# Patient Record
Sex: FEMALE | Race: WHITE | NOT HISPANIC OR LATINO | Employment: OTHER | URBAN - METROPOLITAN AREA
[De-identification: names, ages, dates, MRNs, and addresses within clinical notes are randomized per-mention and may not be internally consistent; named-entity substitution may affect disease eponyms.]

---

## 2016-11-11 LAB — CRC RECOMMENDATION EXT: NORMAL

## 2017-12-04 ENCOUNTER — HISTORICAL (OUTPATIENT)
Dept: ADMINISTRATIVE | Facility: HOSPITAL | Age: 67
End: 2017-12-04

## 2018-04-13 ENCOUNTER — HISTORICAL (OUTPATIENT)
Dept: ADMINISTRATIVE | Facility: HOSPITAL | Age: 68
End: 2018-04-13

## 2019-01-04 LAB — BMD RECOMMENDATION EXT: NORMAL

## 2020-06-16 ENCOUNTER — HISTORICAL (OUTPATIENT)
Dept: ADMINISTRATIVE | Facility: HOSPITAL | Age: 70
End: 2020-06-16

## 2021-10-08 ENCOUNTER — HISTORICAL (OUTPATIENT)
Dept: ADMINISTRATIVE | Facility: HOSPITAL | Age: 71
End: 2021-10-08

## 2021-10-08 LAB
ABS NEUT (OLG): 2.81 X10(3)/MCL (ref 2.1–9.2)
ALBUMIN SERPL-MCNC: 4.2 GM/DL (ref 3.4–4.8)
ALBUMIN/GLOB SERPL: 1.7 RATIO (ref 1.1–2)
ALP SERPL-CCNC: 76 UNIT/L (ref 40–150)
ALT SERPL-CCNC: 28 UNIT/L (ref 0–55)
APPEARANCE, UA: ABNORMAL
AST SERPL-CCNC: 31 UNIT/L (ref 5–34)
BACTERIA SPEC CULT: ABNORMAL /HPF
BASOPHILS # BLD AUTO: 0.1 X10(3)/MCL (ref 0–0.2)
BASOPHILS NFR BLD AUTO: 1 %
BILIRUB SERPL-MCNC: 0.7 MG/DL
BILIRUB UR QL STRIP: NEGATIVE
BILIRUBIN DIRECT+TOT PNL SERPL-MCNC: 0.3 MG/DL (ref 0–0.5)
BILIRUBIN DIRECT+TOT PNL SERPL-MCNC: 0.4 MG/DL (ref 0–0.8)
BUN SERPL-MCNC: 23.5 MG/DL (ref 9.8–20.1)
CALCIUM SERPL-MCNC: 10.4 MG/DL (ref 8.4–10.2)
CHLORIDE SERPL-SCNC: 105 MMOL/L (ref 98–107)
CHOLEST SERPL-MCNC: 192 MG/DL
CHOLEST/HDLC SERPL: 4 {RATIO} (ref 0–5)
CO2 SERPL-SCNC: 28 MMOL/L (ref 23–31)
COLOR UR: YELLOW
CREAT SERPL-MCNC: 0.97 MG/DL (ref 0.55–1.02)
EOSINOPHIL # BLD AUTO: 0.2 X10(3)/MCL (ref 0–0.9)
EOSINOPHIL NFR BLD AUTO: 4 %
ERYTHROCYTE [DISTWIDTH] IN BLOOD BY AUTOMATED COUNT: 13.4 % (ref 11.5–17)
GLOBULIN SER-MCNC: 2.5 GM/DL (ref 2.4–3.5)
GLUCOSE (UA): NEGATIVE
GLUCOSE SERPL-MCNC: 90 MG/DL (ref 82–115)
HCT VFR BLD AUTO: 44.2 % (ref 37–47)
HDLC SERPL-MCNC: 52 MG/DL (ref 35–60)
HGB BLD-MCNC: 14.3 GM/DL (ref 12–16)
HGB UR QL STRIP: NEGATIVE
KETONES UR QL STRIP: NEGATIVE
LDLC SERPL CALC-MCNC: 128 MG/DL (ref 50–140)
LEUKOCYTE ESTERASE UR QL STRIP: ABNORMAL
LYMPHOCYTES # BLD AUTO: 2.4 X10(3)/MCL (ref 0.6–4.6)
LYMPHOCYTES NFR BLD AUTO: 38 %
MCH RBC QN AUTO: 30 PG (ref 27–31)
MCHC RBC AUTO-ENTMCNC: 32.4 GM/DL (ref 33–36)
MCV RBC AUTO: 92.9 FL (ref 80–94)
MONOCYTES # BLD AUTO: 0.7 X10(3)/MCL (ref 0.1–1.3)
MONOCYTES NFR BLD AUTO: 11 %
NEUTROPHILS # BLD AUTO: 2.81 X10(3)/MCL (ref 2.1–9.2)
NEUTROPHILS NFR BLD AUTO: 45 %
NITRITE UR QL STRIP: NEGATIVE
PH UR STRIP: 7.5 [PH] (ref 5–9)
PLATELET # BLD AUTO: 222 X10(3)/MCL (ref 130–400)
PMV BLD AUTO: 11.3 FL (ref 9.4–12.4)
POTASSIUM SERPL-SCNC: 3.9 MMOL/L (ref 3.5–5.1)
PROT SERPL-MCNC: 6.7 GM/DL (ref 5.8–7.6)
PROT UR QL STRIP: NEGATIVE
RBC # BLD AUTO: 4.76 X10(6)/MCL (ref 4.2–5.4)
RBC #/AREA URNS HPF: ABNORMAL /[HPF]
SODIUM SERPL-SCNC: 143 MMOL/L (ref 136–145)
SP GR UR STRIP: 1.02 (ref 1–1.03)
SQUAMOUS EPITHELIAL, UA: ABNORMAL /HPF (ref 0–4)
TRIGL SERPL-MCNC: 59 MG/DL (ref 37–140)
TSH SERPL-ACNC: 1.51 UIU/ML (ref 0.35–4.94)
UROBILINOGEN UR STRIP-ACNC: 0.2
VLDLC SERPL CALC-MCNC: 12 MG/DL
WBC # SPEC AUTO: 6.2 X10(3)/MCL (ref 4.5–11.5)
WBC #/AREA URNS HPF: ABNORMAL /[HPF]

## 2021-10-11 ENCOUNTER — HISTORICAL (OUTPATIENT)
Dept: ADMINISTRATIVE | Facility: HOSPITAL | Age: 71
End: 2021-10-11

## 2021-10-11 LAB — SARS-COV-2 RNA RESP QL NAA+PROBE: NEGATIVE

## 2021-12-20 ENCOUNTER — HISTORICAL (OUTPATIENT)
Dept: ADMINISTRATIVE | Facility: HOSPITAL | Age: 71
End: 2021-12-20

## 2022-01-18 ENCOUNTER — HISTORICAL (OUTPATIENT)
Dept: LAB | Facility: HOSPITAL | Age: 72
End: 2022-01-18

## 2022-01-19 ENCOUNTER — HISTORICAL (OUTPATIENT)
Dept: SURGERY | Facility: HOSPITAL | Age: 72
End: 2022-01-19

## 2022-02-15 ENCOUNTER — HISTORICAL (OUTPATIENT)
Dept: LAB | Facility: HOSPITAL | Age: 72
End: 2022-02-15

## 2022-02-15 LAB — SARS-COV-2 AG RESP QL IA.RAPID: NEGATIVE

## 2022-02-16 ENCOUNTER — HISTORICAL (OUTPATIENT)
Dept: SURGERY | Facility: HOSPITAL | Age: 72
End: 2022-02-16

## 2022-04-07 ENCOUNTER — HISTORICAL (OUTPATIENT)
Dept: ADMINISTRATIVE | Facility: HOSPITAL | Age: 72
End: 2022-04-07
Payer: MEDICARE

## 2022-04-23 VITALS
WEIGHT: 141.13 LBS | SYSTOLIC BLOOD PRESSURE: 126 MMHG | BODY MASS INDEX: 21.39 KG/M2 | DIASTOLIC BLOOD PRESSURE: 81 MMHG | OXYGEN SATURATION: 96 % | HEIGHT: 68 IN

## 2022-05-01 NOTE — HISTORICAL [facility] CERNER
This is a historical note converted from Cergifty. Formatting and pictures may have been removed.  Please reference Cergifty for original formatting and attached multimedia. Chief Complaint  on and off few weeks sob, chest pressure, cough, tried taking mucinex with did help with phlem, denies fever or body aches  History of Present Illness  70-year-old female?presents with on and off symptoms of chest pressure and shortness of breath and cough.?Some improvement with Mucinex. No fever or body aches.  Review of Systems  Constitutional_negative for fever  ENMT_+rhinorrhea  Respiratory_+ cough  Gastrointestinal_negative for nausea or vomiting  Integumentary_negative for rash  Physical Exam  Vitals & Measurements  T:?36.9? ?C (Oral)? HR:?74(Peripheral)? RR:?17? BP:?124/83? SpO2:?98%?  HT:?167.64?cm? WT:?67.13?kg? BMI:?23.89?  Gen: WD NAD  CV: S1S2 RRR no MRG  Resp: few scattered wheezes  HEENT: clear rhinorrhea with enlarged erythematous nasal turbinates bilaterally, no cervical UNA, +PND, TMs without bulging or erythema bilaterally, no posterior pharyngeal erythema?or tonsillar erythema enlargement or exudate  Extr: no CCE  Integument: warm, no rashes or lesions  Psych: Cooperative, approp mood and affect  Assessment/Plan  1.?Chest congestion?R09.89  ?Prednisone taper, take with food.? May use albuterol inhaler?as needed for wheezing and shortness of breath or cough, 2 puffs every 4 hours, use with spacer/adapter.?  X-ray the chest done today, per my review and per final no pneumonia or other acute concerns.  If persistent may need to get evaluated by Allergist.  Ordered:  albuterol, 2 puff(s), INH, q4hr, PRN PRN for wheezing, # 1 EA, 0 Refill(s), Pharmacy: [id number], 167.64, cm, Height/Length Dosing, 06/16/20 9:13:00 CDT, 67.13, kg, Weight Dosing, 06/16/20 9:13:00 CDT  predniSONE, 10 mg = 1 tab(s), Oral, Daily, take 4 tabs for 3 days 3 tabs for 3 days 2 tabs for 3 days 1 tab for 3 days then 1/2 tab for 3 days,  # 32 tab(s), 0 Refill(s), Pharmacy: [id number], 167.64, cm, Height/Length Dosing, 06/16/20 9:13:00 CDT,...  ?   Problem List/Past Medical History  Ongoing  Bladder spasms  HTN (hypertension)  LAD (lymphadenopathy)  Neck swelling  Urinary urgency  Well adult  Well adult exam  Historical  No qualifying data  Procedure/Surgical History  Cataract surgery (10.2015)  Cataract surgery (09.2015)  Partial hysterectomy (08/04/2001)   Medications  Azopt 1% Opthal. Susp  brimonidine 0.15% ophthalmic solution  Citracal Calcium + D Slow Release 1200, 2 tab(s), Oral, qAM  Flax Seed Oil oral capsule  hydrochlorothiazide-triamterene 25 mg-37.5 mg oral tablet, See Instructions  Lumigan 0.01% ophthalmic solution  Poly Hist Forte 10.5 mg-10 mg oral tablet, 1 tab(s), Oral, q4hr, PRN, 1 refills  Poly Hist Forte 7.5 mg-10 mg oral tablet, See Instructions, 3 refills,? ?Not taking  prednisONE 10 mg oral tablet, 10 mg= 1 tab(s), Oral, Daily  PreserVision oral tablet, 1 tab(s), Oral, BID  ProAir HFA 90 mcg/inh inhalation aerosol with adapter, 2 puff(s), INH, q4hr, PRN  Super B Complex oral tablet, 1 tab(s), Oral, Daily  Allergies  Contrast Dye  NSAIDs  codeine  Social History  Abuse/Neglect  No, 06/16/2020  Alcohol  Current, 1-2 times per week, 08/04/2015  Employment/School  Employed, 08/04/2015  Exercise  Exercise frequency: 3-4 times/week., 08/04/2015  Home/Environment  Lives with Spouse., 08/04/2015  Nutrition/Health  Regular, 08/04/2015  Sexual  Sexually active: Yes., 02/21/2017  Substance Use  Never, 08/04/2015  Tobacco  Never (less than 100 in lifetime), N/A, 06/16/2020  Never (less than 100 in lifetime), No, 03/15/2019  Family History  CAD (coronary artery disease): Father.  Glaucoma.: Mother.  Immunizations  Vaccine Date Status   influenza virus vaccine, inactivated 10/16/2017 Recorded   pneumococcal 23-valent vaccine 11/09/2016 Recorded   influenza virus vaccine, inactivated 10/17/2016 Recorded   influenza virus vaccine,  inactivated 10/21/2015 Recorded   Health Maintenance  Health Maintenance  ???Pending?(in the next year)  ??? ??OverDue  ??? ? ? ?Pneumococcal Vaccine due??and every?  ??? ? ? ?Pneumococcal Vaccine due??12/12/17??and every 100??year(s)  ??? ? ? ?TB Skin Test due??07/30/19??and every 1??year(s)  ??? ? ? ?Advance Directive due??01/01/20??and every 1??year(s)  ??? ? ? ?Alcohol Misuse Screening due??01/01/20??and every 1??year(s)  ??? ? ? ?Aspirin Therapy for CVD Prevention due??03/15/20??and every 1??year(s)  ??? ? ? ?Hypertension Maintenance-Medication Prescribed due??03/15/20??and every 1??year(s)  ??? ? ? ?Hypertension Management-Education due??03/15/20??and every 1??year(s)  ??? ? ? ?Hypertension Management-BMP due??03/28/20??and every 1??year(s)  ??? ??Due?  ??? ? ? ?ADL Screening due??06/16/20??and every 1??year(s)  ??? ? ? ?Tetanus Vaccine due??06/16/20??and every 10??year(s)  ??? ? ? ?Zoster Vaccine due??06/16/20??and every 100??year(s)  ??? ??Due In Future?  ??? ? ? ?Cognitive Screening not due until??01/01/21??and every 1??year(s)  ??? ? ? ?Fall Risk Assessment not due until??01/01/21??and every 1??year(s)  ??? ? ? ?Functional Assessment not due until??01/01/21??and every 1??year(s)  ??? ? ? ?Obesity Screening not due until??01/01/21??and every 1??year(s)  ??? ? ? ?Bone Density Screening not due until??01/04/21??and every 2??year(s)  ???Satisfied?(in the past 1 year)  ??? ??Satisfied?  ??? ? ? ?Blood Pressure Screening on??06/16/20.??Satisfied by Jaison Black LPN  ??? ? ? ?Body Mass Index Check on??06/16/20.??Satisfied by Jaison Black LPN  ??? ? ? ?Breast Cancer Screening ([facility]) on??01/10/20.  ??? ? ? ?Fall Risk Assessment on??06/16/20.??Satisfied by Jaison Black LPN  ??? ? ? ?Hypertension Management-Blood Pressure on??06/16/20.??Satisfied by Jaison Black LPN  ??? ? ? ?Obesity Screening on??06/16/20.??Satisfied by Jaison Black LPN  ?

## 2022-05-01 NOTE — HISTORICAL [facility] CERNER
This is a historical note converted from Darvin. Formatting and pictures may have been removed.  Please reference Darvin for original formatting and attached multimedia. Chief Complaint  New patient here with right ankle pain after twisting 12/1/2017. Xrays today.  History of Present Illness  She is a pleasant 67-year-old female who?was in [place] on 12/1/17 and twisted her ankle?while walking the streets. She had?no real pain was able to continue the day but the next morning woke up with pain and swelling. Swelling is localized laterally.?Pain also bothers her worse with ambulation. Somewhat better with rest. She is?elevated the leg?and is used ice. Shes taken Tylenol intermittently as she has an ALLERGY to NSAIDs  Review of Systems  Comprehensive review of system?was performed with no exceptions other than noted in the history of present illness  Physical Exam  Vitals & Measurements  BP:?132/70?  HT:?167.64?cm? HT:?167.64?cm? HT:?167.64?cm? WT:?65.317?kg? WT:?65.317?kg? WT:?65.317?kg? BMI:?23.24?  Gen: WN, WD, NAD  Card/Res: NL breathing, +distal pulses  Abdomen: ND  Muscular skeletal: She has swelling?along the lateral part of her ankle.?She has good range of motion the ankle. Shes tender palpation over her lateral ligaments and less so over her lateral malleolus. She is nontender over her fifth metatarsal.?She has good range of motion her foot. Shes neurovascularly intact. + DP pulse  Assessment/Plan  1.?Left ankle sprain  Rest,?Tylenol,?home directed?therapy,?ice, and elevation.?If her pain persists well consider formal physical therapy  Ordered:  Office/Outpatient Visit Level 1 New [id number] PC, Left ankle sprain, [facility], 12/04/17 13:37:00 CST  ?  Orders:  XR Ankle Left Minimum 3 Views, Routine, 12/04/17 13:13:00 CST, Pain, None, Ambulatory, Rad Type, Left ankle pain, Not Scheduled, 12/04/17 13:13:00 CST   Problem List/Past Medical History  Ongoing  Bladder spasms  HTN (hypertension)  LAD  (lymphadenopathy)  Neck swelling  Urinary urgency  Well adult  Historical  Procedure/Surgical History  Cataract surgery (10/2015)  Cataract surgery (09/2015)  Partial hysterectomy (08/04/2001)  Medications  Azopt 1% Opthal. Susp, 1 drop(s), OPTH, TID,? ?Not taking  biotin 1000 mcg oral tablet, 1000 mcg= 1 tab(s), Oral, Daily  Citracal Calcium + D Slow Release 1200, 2 tab(s), Oral, qAM  Fish Oil 1200 mg oral capsule, 1200 mg= 1 cap(s), Oral, BID  Flax Seed Oil oral capsule  hydroCHLOROthiazide-triamterene 25 mg-37.5 mg oral tablet, 1 tab(s), Oral, Daily, 3 refills  Icaps oral tablet  Lumigan 0.01% ophthalmic solution, 1 drop(s), Eye-Both, qPM  Medrol Dosepak 4 mg oral tablet, 1 packet(s), Oral, As Directed,? ?Not taking  Poly Hist Forte 7.5 mg-10 mg oral tablet, See Instructions, 10 refills  Premarin 0.625 mg oral tablet, 0.625 mg= 1 tab(s), Oral, Daily  PreserVision oral tablet, 1 tab(s), Oral, BID  SIMBRINZA 1%-0.2% EYE DROPS  Super B Complex oral tablet, 1 tab(s), Oral, Daily  Allergies  Contrast Dye  NSAIDs  codeine  Social History  Alcohol - 02/21/2017  Current, 1-2 times per week  Employment/School - 02/21/2017  Employed  Exercise - 02/21/2017  Exercise frequency: 3-4 times/week.  Home/Environment - 02/21/2017  Lives with Spouse.  Nutrition/Health - 02/21/2017  Regular  Sexual - 02/21/2017  Sexually active: Yes.  Substance Abuse - 02/21/2017  Never  Tobacco - 02/21/2017  Never smoker  Family History  CAD (coronary artery disease): Father.  Glaucoma.: Mother.  Diagnostic Results  Ankle radiographs 12/4/17: 3 views ankle show no fracture

## 2022-05-02 NOTE — HISTORICAL [facility] CERNER
This is a historical note converted from Darvin. Formatting and pictures may have been removed.  Please reference Darvin for original formatting and attached multimedia. Procedure Name  1. Left L2 Transforaminal Epidural Steroid Injection  2. Left L4 Transforaminal Epidural Steroid Injection  ?  Pre-Procedure Diagnoses:  1. Chronic pain syndrome  2. Low back pain  3. Lumbar radiculopathy  4. Lumbar disc displacement  5. Lumbar degenerative disc disease  ?   Post-Procedure Diagnoses:  1. Chronic pain syndrome  2. Low back pain  3. Lumbar radiculopathy  4. Lumbar disc displacement  5. Lumbar degenerative disc disease  ?   Anesthesia:  Local and MAC  ?   Estimated Blood Loss:  None  ?  Complications:  None  ?  Informed Consent:  The procedure, risks, benefits, and alternatives were discussed with the patient.? There were no contraindications to the procedure.? The patient expressed understanding and agreed to proceed.? Fully informed written consent was obtained.?  ?  Description of the Procedure:  The patient was taken to the operating room.? IV access was obtained prior to the start of the procedure.? The patient was positioned prone on the fluoroscopy table.? Continuous hemodynamic monitoring was initiated and continued throughout the duration of the procedure.? The skin overlying the lumbosacral spine was prepped with Chloraprep and draped into a sterile field.? An oblique fluoroscopic view was obtained on the left side at L4, with the superior articular process of the inferior vertebral body aligned with the pedicle.? Skin anesthesia was achieved using 1 mL of 1% lidocaine.? A 22-gauge 3.5-inch Quinke spinal needle was slowly inserted and advanced towards the 6 oclock position of the pedicle and into the epidural space.? Proper needle position was confirmed under AP, oblique, and lateral fluoroscopic views.? Negative aspiration for blood or CSF was confirmed.? 0.5 mL of Isovue contrast was injected.? Fluoroscopic  imaging revealed a clear outline of the spinal nerve with proximal spread of agent through the neural foramen and into the epidural space.? Then a combination of 5 mg of dexamethasone and 1 mL of 0.25% bupivacaine was easily injected.? There was no pain on injection.? The needle was removed intact and bleeding was nil.? The same procedure was repeated in identical fashion on the left side at L2. Sterile bandages were applied.? The patient was taken to the recovery room for further observation in stable condition.? The patient was then discharged home without any complications.

## 2022-05-21 NOTE — HISTORICAL [facility] CERNER
This is a historical note converted from Darvin. Formatting and pictures may have been removed.  Please reference Darvin for original formatting and attached multimedia. Procedure Name  1. Left L2?Transforaminal Epidural Steroid Injection  2. Left L3 Transforaminal Epidural Steroid Injection  ?  Pre-Procedure Diagnoses:  1. Chronic pain syndrome  2. Low back pain  3. Lumbar radiculopathy  4. Lumbar disc displacement  5. Lumbar degenerative disc disease  ?   Post-Procedure Diagnoses:  1. Chronic pain syndrome  2. Low back pain  3. Lumbar radiculopathy  4. Lumbar disc displacement  5. Lumbar degenerative disc disease  ?   Anesthesia:  Local and MAC  ?   Estimated Blood Loss:  None  ?  Complications:  None  ?  Informed Consent:  The procedure, risks, benefits, and alternatives were discussed with the patient.? There were no contraindications to the procedure.? The patient expressed understanding and agreed to proceed.? Fully informed written consent was obtained.?  ?  Description of the Procedure:  The patient was taken to the operating room.? IV access was obtained prior to the start of the procedure.? The patient was positioned prone on the fluoroscopy table.? Continuous hemodynamic monitoring was initiated and continued throughout the duration of the procedure.? The skin overlying the lumbosacral spine was prepped with Chloraprep and draped into a sterile field.? An oblique fluoroscopic view was obtained on the left side at L2, with the superior articular process of the inferior vertebral body aligned with the pedicle.? Skin anesthesia was achieved using 1 mL of 1% lidocaine.? A 22-gauge 3.5-inch Quinke spinal needle was slowly inserted and advanced towards the 6 oclock position of the pedicle and into the epidural space.? Proper needle position was confirmed under AP, oblique, and lateral fluoroscopic views.? Negative aspiration for blood or CSF was confirmed.? 0.5 mL of Isovue contrast was injected.? Fluoroscopic  imaging revealed a clear outline of the spinal nerve with proximal spread of agent through the neural foramen and into the epidural space.? Then a combination of 5 mg of dexamethasone and 1 mL of 0.25% bupivacaine was easily injected.? There was no pain on injection.? The needle was removed intact and bleeding was nil.? The same procedure was repeated in identical fashion on the left side at L3. Sterile bandages were applied.? The patient was taken to the recovery room for further observation in stable condition.? The patient was then discharged home without any complications.

## 2022-05-23 RX ORDER — AZITHROMYCIN 500 MG/1
500 TABLET, FILM COATED ORAL DAILY
Qty: 5 TABLET | Refills: 0 | Status: SHIPPED | OUTPATIENT
Start: 2022-05-23 | End: 2022-05-28

## 2022-05-23 NOTE — TELEPHONE ENCOUNTER
----- Message from Karan Wyman II, MD sent at 5/23/2022  9:32 AM CDT -----  Regarding: RE: med  Azithromycin 500 mg daily for 5 days  ----- Message -----  From: Yeny Farr LPN  Sent: 5/23/2022   8:59 AM CDT  To: Karan Wyman II, MD  Subject: FW: med                                            ----- Message -----  From: Yadira Negrete  Sent: 5/23/2022   8:54 AM CDT  To: Yeny Farr LPN  Subject: med                                              Pt is req med for sinus congestion, coughing light green mucus, no fever  [facility] [address]

## 2022-06-01 RX ORDER — CIPROFLOXACIN 500 MG/1
500 TABLET ORAL 2 TIMES DAILY
Qty: 10 TABLET | Refills: 0 | Status: SHIPPED | OUTPATIENT
Start: 2022-06-01 | End: 2022-06-06

## 2022-06-01 NOTE — TELEPHONE ENCOUNTER
Unable to get to lab this week d/t babysitting. She is miserable. Asked if you could please help her out.

## 2022-06-01 NOTE — TELEPHONE ENCOUNTER
----- Message from Karan Wyman II, MD sent at 6/1/2022 10:47 AM CDT -----  She was treated with azithromycin. If not improved, then this is allergy related (environmental). Flonase BID for one month, antihistamine  Check U/A since azithromycin would have helped with a UTI also. U/A to see if this is really an infection  ----- Message -----  From: Yeny Farr LPN  Sent: 6/1/2022   9:27 AM CDT  To: Karan Wyman II, MD      ----- Message -----  From: Jaison Sahu  Sent: 6/1/2022   8:54 AM CDT  To: Zamzam CASTAÑEDA II Staff    Patient called stating we called in medication for her sinus, she said she still has mucus and is coughing. She also stated she has a bladder infection. She is having burning, frequency, urgency, and spasms in her urethra.

## 2022-07-08 RX ORDER — TIZANIDINE 4 MG/1
4 TABLET ORAL EVERY 8 HOURS
COMMUNITY
Start: 2022-01-13 | End: 2022-07-14

## 2022-07-08 RX ORDER — NETARSUDIL 0.2 MG/ML
1 SOLUTION/ DROPS OPHTHALMIC; TOPICAL DAILY
COMMUNITY
Start: 2022-05-06 | End: 2023-09-05

## 2022-07-08 RX ORDER — BRINZOLAMIDE 10 MG/ML
1 SUSPENSION/ DROPS OPHTHALMIC 3 TIMES DAILY
COMMUNITY
Start: 2022-05-12 | End: 2023-09-05

## 2022-07-08 RX ORDER — BIMATOPROST 0.1 MG/ML
1 SOLUTION/ DROPS OPHTHALMIC NIGHTLY
COMMUNITY
Start: 2022-05-09

## 2022-07-08 RX ORDER — TRIAMTERENE/HYDROCHLOROTHIAZID 37.5-25 MG
1 TABLET ORAL DAILY
COMMUNITY
Start: 2022-05-22 | End: 2022-12-13

## 2022-07-14 ENCOUNTER — OFFICE VISIT (OUTPATIENT)
Dept: ORTHOPEDICS | Facility: CLINIC | Age: 72
End: 2022-07-14
Payer: MEDICARE

## 2022-07-14 VITALS
BODY MASS INDEX: 23.3 KG/M2 | DIASTOLIC BLOOD PRESSURE: 79 MMHG | HEART RATE: 53 BPM | SYSTOLIC BLOOD PRESSURE: 130 MMHG | WEIGHT: 145 LBS | HEIGHT: 66 IN | TEMPERATURE: 98 F

## 2022-07-14 DIAGNOSIS — M47.816 LUMBAR SPONDYLOSIS: ICD-10-CM

## 2022-07-14 DIAGNOSIS — M51.36 LUMBAR DEGENERATIVE DISC DISEASE: ICD-10-CM

## 2022-07-14 DIAGNOSIS — G89.29 CHRONIC BACK PAIN GREATER THAN 3 MONTHS DURATION: Primary | ICD-10-CM

## 2022-07-14 DIAGNOSIS — M54.9 CHRONIC BACK PAIN GREATER THAN 3 MONTHS DURATION: Primary | ICD-10-CM

## 2022-07-14 PROBLEM — M51.369 LUMBAR DEGENERATIVE DISC DISEASE: Status: ACTIVE | Noted: 2022-07-14

## 2022-07-14 PROCEDURE — 3008F BODY MASS INDEX DOCD: CPT | Mod: CPTII,,, | Performed by: ANESTHESIOLOGY

## 2022-07-14 PROCEDURE — 3288F FALL RISK ASSESSMENT DOCD: CPT | Mod: CPTII,,, | Performed by: ANESTHESIOLOGY

## 2022-07-14 PROCEDURE — 1101F PT FALLS ASSESS-DOCD LE1/YR: CPT | Mod: CPTII,,, | Performed by: ANESTHESIOLOGY

## 2022-07-14 PROCEDURE — 1101F PR PT FALLS ASSESS DOC 0-1 FALLS W/OUT INJ PAST YR: ICD-10-PCS | Mod: CPTII,,, | Performed by: ANESTHESIOLOGY

## 2022-07-14 PROCEDURE — 3288F PR FALLS RISK ASSESSMENT DOCUMENTED: ICD-10-PCS | Mod: CPTII,,, | Performed by: ANESTHESIOLOGY

## 2022-07-14 PROCEDURE — 99213 PR OFFICE/OUTPT VISIT, EST, LEVL III, 20-29 MIN: ICD-10-PCS | Mod: ,,, | Performed by: ANESTHESIOLOGY

## 2022-07-14 PROCEDURE — 1125F AMNT PAIN NOTED PAIN PRSNT: CPT | Mod: CPTII,,, | Performed by: ANESTHESIOLOGY

## 2022-07-14 PROCEDURE — 1159F PR MEDICATION LIST DOCUMENTED IN MEDICAL RECORD: ICD-10-PCS | Mod: CPTII,,, | Performed by: ANESTHESIOLOGY

## 2022-07-14 PROCEDURE — 1160F PR REVIEW ALL MEDS BY PRESCRIBER/CLIN PHARMACIST DOCUMENTED: ICD-10-PCS | Mod: CPTII,,, | Performed by: ANESTHESIOLOGY

## 2022-07-14 PROCEDURE — 1125F PR PAIN SEVERITY QUANTIFIED, PAIN PRESENT: ICD-10-PCS | Mod: CPTII,,, | Performed by: ANESTHESIOLOGY

## 2022-07-14 PROCEDURE — 3078F DIAST BP <80 MM HG: CPT | Mod: CPTII,,, | Performed by: ANESTHESIOLOGY

## 2022-07-14 PROCEDURE — 1160F RVW MEDS BY RX/DR IN RCRD: CPT | Mod: CPTII,,, | Performed by: ANESTHESIOLOGY

## 2022-07-14 PROCEDURE — 3008F PR BODY MASS INDEX (BMI) DOCUMENTED: ICD-10-PCS | Mod: CPTII,,, | Performed by: ANESTHESIOLOGY

## 2022-07-14 PROCEDURE — 3078F PR MOST RECENT DIASTOLIC BLOOD PRESSURE < 80 MM HG: ICD-10-PCS | Mod: CPTII,,, | Performed by: ANESTHESIOLOGY

## 2022-07-14 PROCEDURE — 3075F PR MOST RECENT SYSTOLIC BLOOD PRESS GE 130-139MM HG: ICD-10-PCS | Mod: CPTII,,, | Performed by: ANESTHESIOLOGY

## 2022-07-14 PROCEDURE — 3075F SYST BP GE 130 - 139MM HG: CPT | Mod: CPTII,,, | Performed by: ANESTHESIOLOGY

## 2022-07-14 PROCEDURE — 99213 OFFICE O/P EST LOW 20 MIN: CPT | Mod: ,,, | Performed by: ANESTHESIOLOGY

## 2022-07-14 PROCEDURE — 1159F MED LIST DOCD IN RCRD: CPT | Mod: CPTII,,, | Performed by: ANESTHESIOLOGY

## 2022-07-14 RX ORDER — CYCLOSPORINE 0.5 MG/ML
1 EMULSION OPHTHALMIC 2 TIMES DAILY
COMMUNITY
Start: 2022-05-06 | End: 2023-09-05

## 2022-07-14 NOTE — PROGRESS NOTES
"   Reyna Mccarthy MD        PATIENT NAME: Sandra Crowe  : 1950  DATE: 22  MRN: [id number]      Billing Provider: Reyna Mccarthy MD  Level of Service:   Patient PCP Information     Provider PCP Type    Primary Doctor No General          Reason for Visit / Chief Complaint: Back Pain (Patient reports having intermittent tightness/deep aching pain in lower back at this time. Reports also having "spasms" below her shoulder blade at this time. States using lidocaine patches to help with pain. Reports pain in back limits her abilities/ ability to perform ADLs.. Denies trying therapy. )       Update PCP  Update Chief Complaint         History of Present Illness / Problem Focused Workflow     Sandra Crowe presents to the clinic with Back Pain (Patient reports having intermittent tightness/deep aching pain in lower back at this time. Reports also having "spasms" below her shoulder blade at this time. States using lidocaine patches to help with pain. Reports pain in back limits her abilities/ ability to perform ADLs.. Denies trying therapy. )     This is a 72-year-old female who returns to clinic today for follow-up of her chronic low back pain.  She was last seen here for follow-up a few months ago, at which time she is doing better since undergoing a series of 2 lumbar epidural steroid injections in January and February of this year.  She states that she has not had any changes overall in her symptoms since she was last seen here.  She complains of some tightness in her lower back.  She denies any radiation to the lower extremities.  When she is doing house work, sometimes she has to lie down and stretch for the the pain to resolve.  She is planning to go back to [facility] next week.    Back Pain        Review of Systems     Review of Systems   Musculoskeletal: Positive for back pain.   All other systems reviewed and are negative.       Medical / Social / Family History     Past Medical History:   Diagnosis " Date    Back pain with left-sided sciatica     DDD (degenerative disc disease), lumbosacral     LAD (lymphadenopathy)     Lumbar foraminal stenosis     Vitamin D deficiency        Past Surgical History:   Procedure Laterality Date    CATARACT EXTRACTION      PARTIAL HYSTERECTOMY  08/04/2001    TRANSFORAMINAL EPIDURAL INJECTION OF STEROID Left 02/16/2022    TRANSFORAMINAL EPIDURAL INJECTION OF STEROID Left 01/19/2022       Social History  Ms. Crowe  reports that she has never smoked. She has never used smokeless tobacco. She reports that she does not drink alcohol and does not use drugs.    Family History  Ms.'s Crowe family history includes Coronary artery disease in her father; Glaucoma in her mother.    Medications and Allergies     Medications  Outpatient Medications Marked as Taking for the 7/14/22 encounter (Office Visit) with Reyna Mccarthy MD   Medication Sig Dispense Refill    brinzolamide (AZOPT) 1 % ophthalmic suspension Place 1 drop into both eyes 3 (three) times daily.      cycloSPORINE (RESTASIS) 0.05 % ophthalmic emulsion Place 1 drop into both eyes 2 (two) times daily.      LUMIGAN 0.01 % Drop Place 1 drop into both eyes nightly.      RHOPRESSA 0.02 % ophthalmic solution Place 1 drop into both eyes once daily.      triamterene-hydrochlorothiazide 37.5-25 mg (MAXZIDE-25) 37.5-25 mg per tablet Take 1 tablet by mouth once daily.         Allergies  Review of patient's allergies indicates:   Allergen Reactions    Codeine      Other reaction(s): Nauseated    Ivp dye [iodinated contrast media] Hives    Nsaids (non-steroidal anti-inflammatory drug)      Other reaction(s): Swelling       Physical Examination     Vitals:    07/14/22 0955   BP: 130/79   Pulse: (!) 53   Temp: 97.6 °F (36.4 °C)     Spine Musculoskeletal Exam    General        Constitutional: appears stated age, well-developed and well-nourished    Scleral icterus: no    Labored breathing: no    Psychiatric: normal mood and  affect and no acute distress    Neurological: alert and oriented x3    Skin: intact       Assessment and Plan (including Health Maintenance)      Problem List  Smart Sets  Document Outside HM   :    Plan:   Chronic back pain greater than 3 months duration    Lumbar degenerative disc disease    Lumbar spondylosis       She is still doing well at this time and will call to follow up as needed.    Problem List Items Addressed This Visit        Orthopedic Problems    Lumbar degenerative disc disease    Lumbar spondylosis       Other    Chronic back pain greater than 3 months duration - Primary            Future Appointments   Date Time Provider Department Center   11/9/2022  9:00 AM Karan Wyman II, MD [facility] [id number]        There are no Patient Instructions on file for this visit.  No follow-ups on file.     Signature:  Reyna Mccarthy MD      Date of encounter: 7/14/22

## 2022-08-24 ENCOUNTER — TELEPHONE (OUTPATIENT)
Dept: INTERNAL MEDICINE | Facility: CLINIC | Age: 72
End: 2022-08-24
Payer: MEDICARE

## 2022-08-24 ENCOUNTER — DOCUMENTATION ONLY (OUTPATIENT)
Dept: INTERNAL MEDICINE | Facility: CLINIC | Age: 72
End: 2022-08-24

## 2022-08-24 LAB — BCS RECOMMENDATION EXT: NORMAL

## 2022-09-12 ENCOUNTER — TELEPHONE (OUTPATIENT)
Dept: INTERNAL MEDICINE | Facility: CLINIC | Age: 72
End: 2022-09-12
Payer: MEDICARE

## 2022-09-12 DIAGNOSIS — H10.9 CONJUNCTIVITIS, UNSPECIFIED CONJUNCTIVITIS TYPE, UNSPECIFIED LATERALITY: Primary | ICD-10-CM

## 2022-09-12 RX ORDER — METHYLPREDNISOLONE 4 MG/1
TABLET ORAL
Qty: 21 EACH | Refills: 0 | Status: SHIPPED | OUTPATIENT
Start: 2022-09-12 | End: 2022-10-03

## 2022-09-12 RX ORDER — CIPROFLOXACIN HYDROCHLORIDE 3 MG/ML
2 SOLUTION/ DROPS OPHTHALMIC 3 TIMES DAILY
Qty: 2.5 ML | Refills: 0 | Status: SHIPPED | OUTPATIENT
Start: 2022-09-12 | End: 2022-09-16

## 2022-09-12 NOTE — TELEPHONE ENCOUNTER
----- Message from Lakeisha Molina Patient Care Assistant sent at 9/12/2022  9:49 AM CDT -----  Regarding: FW: med req  Pt is now calling needs to add some other symptoms she is having such as;  Sore, throat nasal drip and the ears feel full and a h/a.  ----- Message -----  From: Lakeisha Molina Patient Care Assistant  Sent: 9/12/2022   8:53 AM CDT  To: Yeny Farr LPN  Subject: med req                                          Type:  Needs Medical Advice    Who Called: pt's  Karl  Symptoms (please be specific): pink eye; itchy watery burning    How long has patient had these symptoms:  9/10/22  Pharmacy name and phone #:  [facility] on [address]  Would the patient rather a call back or a response via [facility]? C/b if needed  Best Call Back Number: [phone]  Additional Information: both grand kids were dx with pink eye and now pt is suffering from the same thing.

## 2022-09-12 NOTE — TELEPHONE ENCOUNTER
----- Message from Lakeisha Molina, Patient Care Assistant sent at 9/12/2022  8:52 AM CDT -----  Regarding: med req  Type:  Needs Medical Advice    Who Called: pt's  Karl  Symptoms (please be specific): pink eye; itchy watery burning    How long has patient had these symptoms:  9/10/22  Pharmacy name and phone #:  [facility] on [address]  Would the patient rather a call back or a response via [facility]? C/b if needed  Best Call Back Number: [phone]  Additional Information: both grand kids were dx with pink eye and now pt is suffering from the same thing.

## 2022-09-20 ENCOUNTER — TELEPHONE (OUTPATIENT)
Dept: INTERNAL MEDICINE | Facility: CLINIC | Age: 72
End: 2022-09-20
Payer: MEDICARE

## 2022-09-20 DIAGNOSIS — M51.36 LUMBAR DEGENERATIVE DISC DISEASE: Primary | ICD-10-CM

## 2022-09-20 DIAGNOSIS — R09.89 SINUS COMPLAINT: ICD-10-CM

## 2022-09-20 RX ORDER — AMOXICILLIN AND CLAVULANATE POTASSIUM 875; 125 MG/1; MG/1
1 TABLET, FILM COATED ORAL 2 TIMES DAILY
Qty: 14 TABLET | Refills: 0 | Status: SHIPPED | OUTPATIENT
Start: 2022-09-20 | End: 2022-09-27

## 2022-09-20 RX ORDER — AZELASTINE 1 MG/ML
1 SPRAY, METERED NASAL 2 TIMES DAILY
Qty: 30 ML | Refills: 0 | Status: SHIPPED | OUTPATIENT
Start: 2022-09-20 | End: 2022-11-09 | Stop reason: SDUPTHER

## 2022-09-20 NOTE — TELEPHONE ENCOUNTER
----- Message from Yadira Negrete sent at 9/20/2022  8:32 AM CDT -----  Regarding: med  Pt is req antibiotics for stuffy head, earache, sinus infection  [facility], [facility] [address]  [phone]

## 2022-10-22 ENCOUNTER — HOSPITAL ENCOUNTER (EMERGENCY)
Facility: HOSPITAL | Age: 72
Discharge: HOME | End: 2022-10-22
Attending: INTERNAL MEDICINE
Payer: MEDICARE

## 2022-10-22 VITALS
HEIGHT: 67 IN | OXYGEN SATURATION: 96 % | DIASTOLIC BLOOD PRESSURE: 77 MMHG | BODY MASS INDEX: 22.76 KG/M2 | SYSTOLIC BLOOD PRESSURE: 132 MMHG | WEIGHT: 145 LBS | TEMPERATURE: 99 F | HEART RATE: 74 BPM | RESPIRATION RATE: 18 BRPM

## 2022-10-22 DIAGNOSIS — S23.41XA SPRAIN OF COSTAL CARTILAGE, INITIAL ENCOUNTER: Primary | ICD-10-CM

## 2022-10-22 PROCEDURE — 99284 EMERGENCY DEPT VISIT MOD MDM: CPT

## 2022-10-22 PROCEDURE — 63600175 PHARM REV CODE 636 W HCPCS: Performed by: INTERNAL MEDICINE

## 2022-10-22 PROCEDURE — 96372 THER/PROPH/DIAG INJ SC/IM: CPT | Performed by: INTERNAL MEDICINE

## 2022-10-22 RX ORDER — PREDNISONE 20 MG/1
20 TABLET ORAL DAILY
Qty: 5 TABLET | Refills: 0 | Status: SHIPPED | OUTPATIENT
Start: 2022-10-22 | End: 2022-10-27

## 2022-10-22 RX ORDER — METHYLPREDNISOLONE SOD SUCC 125 MG
125 VIAL (EA) INJECTION ONCE
Status: COMPLETED | OUTPATIENT
Start: 2022-10-22 | End: 2022-10-22

## 2022-10-22 RX ORDER — ALBUTEROL SULFATE 90 UG/1
2 AEROSOL, METERED RESPIRATORY (INHALATION) EVERY 6 HOURS PRN
Qty: 18 G | Status: SHIPPED | OUTPATIENT
Start: 2022-10-22 | End: 2022-10-22 | Stop reason: SDUPTHER

## 2022-10-22 RX ORDER — TIZANIDINE 4 MG/1
4 TABLET ORAL EVERY 6 HOURS PRN
Qty: 15 TABLET | Refills: 0 | Status: SHIPPED | OUTPATIENT
Start: 2022-10-22 | End: 2022-11-09

## 2022-10-22 RX ORDER — ALBUTEROL SULFATE 90 UG/1
2 AEROSOL, METERED RESPIRATORY (INHALATION) EVERY 6 HOURS PRN
Qty: 18 G | Refills: 0 | Status: SHIPPED | OUTPATIENT
Start: 2022-10-22 | End: 2023-09-05 | Stop reason: SDUPTHER

## 2022-10-22 RX ADMIN — METHYLPREDNISOLONE SODIUM SUCCINATE 125 MG: 125 INJECTION, POWDER, FOR SOLUTION INTRAMUSCULAR; INTRAVENOUS at 11:10

## 2022-10-22 NOTE — ED TRIAGE NOTES
"Pt complaint of pain to left lat rib area relating that she noticed a "popping sound " at reaching for a bottle of water a week ago. Pt complaint of pain especially at movement or sneezing  "

## 2022-10-22 NOTE — ED PROVIDER NOTES
"     Source of History:  Patient, no limitations    Chief complaint:  Chest Pain (Pt complaint of pain to left lat rib area relating that she noticed a "popping sound " at reaching for a bottle of water a week ago. Pt complaint of pain especially at movement or sneezing)      HPI:  Sandra Crowe is a 72 y.o. female presenting with Chest Pain (Pt complaint of pain to left lat rib area relating that she noticed a "popping sound " at reaching for a bottle of water a week ago. Pt complaint of pain especially at movement or sneezing)       Left rib pain after twisting injury last Sunday  The patient complains of chest pain. The discomfort is described as anterior chest discomfort during cough only, left side. Onset of symptoms was abrupt starting 1 week ago, stable course since that time.  The patient denies cough, dyspnea, abdominal pain, nausea, vomiting, and back pain.       Review of Systems   Constitutional symptoms:  Negative except as documented in HPI.   Skin symptoms:  Negative except as documented in HPI.   HEENT symptoms:  Negative except as documented in HPI.   Respiratory symptoms:  Negative except as documented in HPI.   Cardiovascular symptoms:  Negative except as documented in HPI.   Gastrointestinal symptoms:  Negative except as documented in HPI.    Genitourinary symptoms:  Negative except as documented in HPI.   Musculoskeletal symptoms:  Negative except as documented in HPI.   Neurologic symptoms:  Negative except as documented in HPI.   Psychiatric symptoms:  Negative except as documented in HPI.   Allergy/immunologic symptoms:  Negative except as documented in HPI.             Additional review of systems information: All other systems reviewed and otherwise negative.      Review of patient's allergies indicates:   Allergen Reactions    Codeine      Other reaction(s): Nauseated    Ivp dye [iodinated contrast media] Hives    Nsaids (non-steroidal anti-inflammatory drug)      Other reaction(s): " "Swelling       PMH:  As per HPI and below:    Past Medical History:   Diagnosis Date    Back pain with left-sided sciatica     DDD (degenerative disc disease), lumbosacral     LAD (lymphadenopathy)     Lumbar foraminal stenosis     Vitamin D deficiency         Family History   Problem Relation Age of Onset    Glaucoma Mother     Coronary artery disease Father        Past Surgical History:   Procedure Laterality Date    CATARACT EXTRACTION      PARTIAL HYSTERECTOMY  08/04/2001    TRANSFORAMINAL EPIDURAL INJECTION OF STEROID Left 02/16/2022    TRANSFORAMINAL EPIDURAL INJECTION OF STEROID Left 01/19/2022       Social History     Tobacco Use    Smoking status: Never    Smokeless tobacco: Never   Substance Use Topics    Alcohol use: Never    Drug use: Never       Patient Active Problem List   Diagnosis    Chronic back pain greater than 3 months duration    Lumbar degenerative disc disease    Lumbar spondylosis        Physical Exam:    /77 (BP Location: Left arm, Patient Position: Sitting)   Pulse 66   Temp 98.6 °F (37 °C) (Oral)   Resp 18   Ht 5' 7" (1.702 m)   Wt 65.8 kg (145 lb)   SpO2 97%   Breastfeeding No   BMI 22.71 kg/m²     Nursing note and vital signs reviewed.    General:  Alert, no acute distress.   Skin: Normal for Ethnic Origin, No cyanosis  HEENT: Normocephalic and atraumatic, Vision unchanged, Pupils symmetric, No icterus , Nasal mucosa is pink and moist  Cardiovascular:  Regular rate and rhythm, No edema  Chest Wall: No deformity, equal chest rise palpable area of tenderness left lateral rib 10  Respiratory:  Lungs are clear to auscultation, respirations are non-labored.    Musculoskeletal:  No deformity, Normal perfusion to all extremities  Back: No bony tenderness  : No suprapubic pain, No CVA tenderness  Gastrointestinal:  Soft, Nontender, Non distended, Normal bowel sounds.    Neurological:  Alert and oriented to person, place, time, and situation, normal motor observed, normal speech " observed.    Psychiatric:  Cooperative, appropriate mood & affect.        Labs that have been ordered have been independently reviewed and interpreted by myself.     Old Chart Reviewed.      Initial Impression/ Differential Dx:  chest wall pain, pleural inflammation and pulmonary infectious causes.      MDM:      Reviewed Nurses Note.    Reviewed Pertinent old records.    Orders Placed This Encounter    XR Ribs Min 3 views w/PA Chest Left    methylPREDNISolone sodium succinate injection 125 mg    predniSONE (DELTASONE) 20 MG tablet    tiZANidine (ZANAFLEX) 4 MG tablet    albuterol (VENTOLIN HFA) 90 mcg/actuation inhaler                    Labs Reviewed - No data to display       XR Ribs Min 3 views w/PA Chest Left   Final Result      No acute findings.         Electronically signed by: Vicente Amin   Date:    10/22/2022   Time:    11:05           No visits with results within 1 Day(s) from this visit.   Latest known visit with results is:   Orders Only on 08/24/2022   Component Date Value Ref Range Status    BCS Recommendation External 08/24/2022 Repeat mammogram in 1 year   Final       Imaging Results              XR Ribs Min 3 views w/PA Chest Left (Final result)  Result time 10/22/22 11:05:50      Final result by Vicente Amin MD (10/22/22 11:05:50)                   Impression:      No acute findings.      Electronically signed by: Vicente Amin  Date:    10/22/2022  Time:    11:05               Narrative:    EXAMINATION:  XR RIBS MIN 3 VIEWS W/ PA CHEST LEFT    CLINICAL HISTORY:  rib pain;    COMPARISON:  16 June 2020    FINDINGS:  Frontal view of the chest with two views left ribs.  The heart is not enlarged.  Suspect some atelectasis or scarring left lung base.  No pneumothorax seen.  No rib fracture is seen.                                                                           Diagnostic Impression:    1. Sprain of costal cartilage, initial encounter         ED Disposition Condition    Discharge Stable              Follow-up Information       [facility] - Emergency Dept.    Specialty: Emergency Medicine  Why: If symptoms worsen  Contact information:  [address]  [facility] [id number] [phone]                            ED Prescriptions       Medication Sig Dispense Start Date End Date Auth. Provider    predniSONE (DELTASONE) 20 MG tablet Take 1 tablet (20 mg total) by mouth once daily. for 5 days 5 tablet 10/22/2022 10/27/2022 John Hi, DO    albuterol (VENTOLIN HFA) 90 mcg/actuation inhaler  (Status: Discontinued) Inhale 2 puffs into the lungs every 6 (six) hours as needed for Wheezing. Rescue 18 g 10/22/2022 10/22/2022 John Hi, DO    tiZANidine (ZANAFLEX) 4 MG tablet Take 1 tablet (4 mg total) by mouth every 6 (six) hours as needed. 15 tablet 10/22/2022 -- John Hi, DO    albuterol (VENTOLIN HFA) 90 mcg/actuation inhaler Inhale 2 puffs into the lungs every 6 (six) hours as needed for Wheezing. 18 g 10/22/2022 -- John Hi, DO          Follow-up Information       Follow up With Specialties Details Why Contact Info    [facility] - Emergency Dept Emergency Medicine  If symptoms worsen [address]  [facility] [id number] [phone]             John Hi DO  10/22/22 1127

## 2022-11-02 DIAGNOSIS — E55.9 VITAMIN D DEFICIENCY: ICD-10-CM

## 2022-11-02 DIAGNOSIS — I10 HYPERTENSION, UNSPECIFIED TYPE: ICD-10-CM

## 2022-11-02 DIAGNOSIS — Z00.00 WELL ADULT EXAM: Primary | ICD-10-CM

## 2022-11-02 DIAGNOSIS — Z13.83 SCREENING FOR CARDIOVASCULAR, RESPIRATORY, AND GENITOURINARY DISEASES: ICD-10-CM

## 2022-11-02 DIAGNOSIS — Z13.6 SCREENING FOR CARDIOVASCULAR, RESPIRATORY, AND GENITOURINARY DISEASES: ICD-10-CM

## 2022-11-02 DIAGNOSIS — Z13.21 ENCOUNTER FOR VITAMIN DEFICIENCY SCREENING: ICD-10-CM

## 2022-11-02 DIAGNOSIS — E78.5 HYPERLIPIDEMIA, UNSPECIFIED HYPERLIPIDEMIA TYPE: ICD-10-CM

## 2022-11-02 DIAGNOSIS — Z13.89 SCREENING FOR CARDIOVASCULAR, RESPIRATORY, AND GENITOURINARY DISEASES: ICD-10-CM

## 2022-11-07 ENCOUNTER — LAB VISIT (OUTPATIENT)
Dept: LAB | Facility: HOSPITAL | Age: 72
End: 2022-11-07
Attending: INTERNAL MEDICINE
Payer: MEDICARE

## 2022-11-07 DIAGNOSIS — E78.5 HYPERLIPIDEMIA, UNSPECIFIED HYPERLIPIDEMIA TYPE: ICD-10-CM

## 2022-11-07 DIAGNOSIS — I10 HYPERTENSION, UNSPECIFIED TYPE: ICD-10-CM

## 2022-11-07 DIAGNOSIS — Z13.89 SCREENING FOR CARDIOVASCULAR, RESPIRATORY, AND GENITOURINARY DISEASES: ICD-10-CM

## 2022-11-07 DIAGNOSIS — Z13.21 ENCOUNTER FOR VITAMIN DEFICIENCY SCREENING: ICD-10-CM

## 2022-11-07 DIAGNOSIS — Z13.83 SCREENING FOR CARDIOVASCULAR, RESPIRATORY, AND GENITOURINARY DISEASES: ICD-10-CM

## 2022-11-07 DIAGNOSIS — E55.9 VITAMIN D DEFICIENCY: ICD-10-CM

## 2022-11-07 DIAGNOSIS — Z00.00 WELL ADULT EXAM: ICD-10-CM

## 2022-11-07 DIAGNOSIS — Z13.6 SCREENING FOR CARDIOVASCULAR, RESPIRATORY, AND GENITOURINARY DISEASES: ICD-10-CM

## 2022-11-07 LAB
ALBUMIN SERPL-MCNC: 4.1 GM/DL (ref 3.4–4.8)
ALBUMIN/GLOB SERPL: 1.7 RATIO (ref 1.1–2)
ALP SERPL-CCNC: 79 UNIT/L (ref 40–150)
ALT SERPL-CCNC: 22 UNIT/L (ref 0–55)
APPEARANCE UR: CLEAR
AST SERPL-CCNC: 24 UNIT/L (ref 5–34)
BACTERIA #/AREA URNS AUTO: NORMAL /HPF
BASOPHILS # BLD AUTO: 0.08 X10(3)/MCL (ref 0–0.2)
BASOPHILS NFR BLD AUTO: 1.1 %
BILIRUB UR QL STRIP.AUTO: NEGATIVE MG/DL
BILIRUBIN DIRECT+TOT PNL SERPL-MCNC: 0.6 MG/DL
BUN SERPL-MCNC: 18.5 MG/DL (ref 9.8–20.1)
CALCIUM SERPL-MCNC: 9.5 MG/DL (ref 8.4–10.2)
CHLORIDE SERPL-SCNC: 104 MMOL/L (ref 98–107)
CHOLEST SERPL-MCNC: 194 MG/DL
CHOLEST/HDLC SERPL: 4 {RATIO} (ref 0–5)
CO2 SERPL-SCNC: 29 MMOL/L (ref 23–31)
COLOR UR AUTO: YELLOW
CREAT SERPL-MCNC: 0.87 MG/DL (ref 0.55–1.02)
DEPRECATED CALCIDIOL+CALCIFEROL SERPL-MC: 41.3 NG/ML (ref 30–80)
EOSINOPHIL # BLD AUTO: 0.28 X10(3)/MCL (ref 0–0.9)
EOSINOPHIL NFR BLD AUTO: 3.9 %
ERYTHROCYTE [DISTWIDTH] IN BLOOD BY AUTOMATED COUNT: 13.6 % (ref 11.5–17)
GFR SERPLBLD CREATININE-BSD FMLA CKD-EPI: >60 MLS/MIN/1.73/M2
GLOBULIN SER-MCNC: 2.4 GM/DL (ref 2.4–3.5)
GLUCOSE SERPL-MCNC: 92 MG/DL (ref 82–115)
GLUCOSE UR QL STRIP.AUTO: NEGATIVE MG/DL
HCT VFR BLD AUTO: 43.7 % (ref 37–47)
HDLC SERPL-MCNC: 55 MG/DL (ref 35–60)
HGB BLD-MCNC: 14.1 GM/DL (ref 12–16)
IMM GRANULOCYTES # BLD AUTO: 0.03 X10(3)/MCL (ref 0–0.04)
IMM GRANULOCYTES NFR BLD AUTO: 0.4 %
KETONES UR QL STRIP.AUTO: NEGATIVE MG/DL
LDLC SERPL CALC-MCNC: 125 MG/DL (ref 50–140)
LEUKOCYTE ESTERASE UR QL STRIP.AUTO: NEGATIVE UNIT/L
LYMPHOCYTES # BLD AUTO: 2.14 X10(3)/MCL (ref 0.6–4.6)
LYMPHOCYTES NFR BLD AUTO: 29.6 %
MCH RBC QN AUTO: 30.4 PG (ref 27–31)
MCHC RBC AUTO-ENTMCNC: 32.3 MG/DL (ref 33–36)
MCV RBC AUTO: 94.2 FL (ref 80–94)
MONOCYTES # BLD AUTO: 0.64 X10(3)/MCL (ref 0.1–1.3)
MONOCYTES NFR BLD AUTO: 8.9 %
NEUTROPHILS # BLD AUTO: 4.1 X10(3)/MCL (ref 2.1–9.2)
NEUTROPHILS NFR BLD AUTO: 56.1 %
NITRITE UR QL STRIP.AUTO: NEGATIVE
NRBC BLD AUTO-RTO: 0 %
PH UR STRIP.AUTO: 8.5 [PH]
PLATELET # BLD AUTO: 232 X10(3)/MCL (ref 130–400)
PMV BLD AUTO: 11.3 FL (ref 7.4–10.4)
POTASSIUM SERPL-SCNC: 3.8 MMOL/L (ref 3.5–5.1)
PROT SERPL-MCNC: 6.5 GM/DL (ref 5.8–7.6)
PROT UR QL STRIP.AUTO: NEGATIVE MG/DL
RBC # BLD AUTO: 4.64 X10(6)/MCL (ref 4.2–5.4)
RBC #/AREA URNS AUTO: <5 /HPF
RBC UR QL AUTO: NEGATIVE UNIT/L
SODIUM SERPL-SCNC: 142 MMOL/L (ref 136–145)
SP GR UR STRIP.AUTO: 1.02 (ref 1–1.03)
SQUAMOUS #/AREA URNS AUTO: <5 /HPF
TRIGL SERPL-MCNC: 69 MG/DL (ref 37–140)
TSH SERPL-ACNC: 0.97 UIU/ML (ref 0.35–4.94)
UROBILINOGEN UR STRIP-ACNC: 0.2 MG/DL
VIT B12 SERPL-MCNC: 694 PG/ML (ref 213–816)
VLDLC SERPL CALC-MCNC: 14 MG/DL
WBC # SPEC AUTO: 7.2 X10(3)/MCL (ref 4.5–11.5)
WBC #/AREA URNS AUTO: <5 /HPF

## 2022-11-07 PROCEDURE — 82607 VITAMIN B-12: CPT

## 2022-11-07 PROCEDURE — 81001 URINALYSIS AUTO W/SCOPE: CPT

## 2022-11-07 PROCEDURE — 80053 COMPREHEN METABOLIC PANEL: CPT

## 2022-11-07 PROCEDURE — 36415 COLL VENOUS BLD VENIPUNCTURE: CPT

## 2022-11-07 PROCEDURE — 82306 VITAMIN D 25 HYDROXY: CPT

## 2022-11-07 PROCEDURE — 80061 LIPID PANEL: CPT

## 2022-11-07 PROCEDURE — 85025 COMPLETE CBC W/AUTO DIFF WBC: CPT

## 2022-11-07 PROCEDURE — 84443 ASSAY THYROID STIM HORMONE: CPT

## 2022-11-09 ENCOUNTER — DOCUMENTATION ONLY (OUTPATIENT)
Dept: ADMINISTRATIVE | Facility: HOSPITAL | Age: 72
End: 2022-11-09
Payer: MEDICARE

## 2022-11-09 ENCOUNTER — OFFICE VISIT (OUTPATIENT)
Dept: INTERNAL MEDICINE | Facility: CLINIC | Age: 72
End: 2022-11-09
Payer: MEDICARE

## 2022-11-09 VITALS
OXYGEN SATURATION: 99 % | RESPIRATION RATE: 14 BRPM | HEART RATE: 68 BPM | BODY MASS INDEX: 22.6 KG/M2 | TEMPERATURE: 98 F | DIASTOLIC BLOOD PRESSURE: 72 MMHG | WEIGHT: 144 LBS | SYSTOLIC BLOOD PRESSURE: 126 MMHG | HEIGHT: 67 IN

## 2022-11-09 DIAGNOSIS — I10 PRIMARY HYPERTENSION: ICD-10-CM

## 2022-11-09 DIAGNOSIS — R09.89 SINUS COMPLAINT: ICD-10-CM

## 2022-11-09 DIAGNOSIS — M51.36 LUMBAR DEGENERATIVE DISC DISEASE: ICD-10-CM

## 2022-11-09 DIAGNOSIS — M54.9 CHRONIC BACK PAIN GREATER THAN 3 MONTHS DURATION: ICD-10-CM

## 2022-11-09 DIAGNOSIS — E55.9 VITAMIN D DEFICIENCY: ICD-10-CM

## 2022-11-09 DIAGNOSIS — G89.29 CHRONIC BACK PAIN GREATER THAN 3 MONTHS DURATION: ICD-10-CM

## 2022-11-09 DIAGNOSIS — Z23 NEED FOR INFLUENZA VACCINATION: ICD-10-CM

## 2022-11-09 DIAGNOSIS — Z00.00 WELLNESS EXAMINATION: Primary | ICD-10-CM

## 2022-11-09 PROCEDURE — 3008F BODY MASS INDEX DOCD: CPT | Mod: CPTII,,, | Performed by: INTERNAL MEDICINE

## 2022-11-09 PROCEDURE — 1159F PR MEDICATION LIST DOCUMENTED IN MEDICAL RECORD: ICD-10-PCS | Mod: CPTII,,, | Performed by: INTERNAL MEDICINE

## 2022-11-09 PROCEDURE — 1160F PR REVIEW ALL MEDS BY PRESCRIBER/CLIN PHARMACIST DOCUMENTED: ICD-10-PCS | Mod: CPTII,,, | Performed by: INTERNAL MEDICINE

## 2022-11-09 PROCEDURE — 1101F PR PT FALLS ASSESS DOC 0-1 FALLS W/OUT INJ PAST YR: ICD-10-PCS | Mod: CPTII,,, | Performed by: INTERNAL MEDICINE

## 2022-11-09 PROCEDURE — 3288F PR FALLS RISK ASSESSMENT DOCUMENTED: ICD-10-PCS | Mod: CPTII,,, | Performed by: INTERNAL MEDICINE

## 2022-11-09 PROCEDURE — G0008 ADMIN INFLUENZA VIRUS VAC: HCPCS | Mod: ,,, | Performed by: INTERNAL MEDICINE

## 2022-11-09 PROCEDURE — 3078F DIAST BP <80 MM HG: CPT | Mod: CPTII,,, | Performed by: INTERNAL MEDICINE

## 2022-11-09 PROCEDURE — 90694 FLU VACCINE - QUADRIVALENT - ADJUVANTED: ICD-10-PCS | Mod: ,,, | Performed by: INTERNAL MEDICINE

## 2022-11-09 PROCEDURE — 3074F PR MOST RECENT SYSTOLIC BLOOD PRESSURE < 130 MM HG: ICD-10-PCS | Mod: CPTII,,, | Performed by: INTERNAL MEDICINE

## 2022-11-09 PROCEDURE — 3078F PR MOST RECENT DIASTOLIC BLOOD PRESSURE < 80 MM HG: ICD-10-PCS | Mod: CPTII,,, | Performed by: INTERNAL MEDICINE

## 2022-11-09 PROCEDURE — 90694 VACC AIIV4 NO PRSRV 0.5ML IM: CPT | Mod: ,,, | Performed by: INTERNAL MEDICINE

## 2022-11-09 PROCEDURE — G0008 FLU VACCINE - QUADRIVALENT - ADJUVANTED: ICD-10-PCS | Mod: ,,, | Performed by: INTERNAL MEDICINE

## 2022-11-09 PROCEDURE — 3008F PR BODY MASS INDEX (BMI) DOCUMENTED: ICD-10-PCS | Mod: CPTII,,, | Performed by: INTERNAL MEDICINE

## 2022-11-09 PROCEDURE — 1159F MED LIST DOCD IN RCRD: CPT | Mod: CPTII,,, | Performed by: INTERNAL MEDICINE

## 2022-11-09 PROCEDURE — 1160F RVW MEDS BY RX/DR IN RCRD: CPT | Mod: CPTII,,, | Performed by: INTERNAL MEDICINE

## 2022-11-09 PROCEDURE — G0439 PPPS, SUBSEQ VISIT: HCPCS | Mod: ,,, | Performed by: INTERNAL MEDICINE

## 2022-11-09 PROCEDURE — 3288F FALL RISK ASSESSMENT DOCD: CPT | Mod: CPTII,,, | Performed by: INTERNAL MEDICINE

## 2022-11-09 PROCEDURE — G0439 PR MEDICARE ANNUAL WELLNESS SUBSEQUENT VISIT: ICD-10-PCS | Mod: ,,, | Performed by: INTERNAL MEDICINE

## 2022-11-09 PROCEDURE — 3074F SYST BP LT 130 MM HG: CPT | Mod: CPTII,,, | Performed by: INTERNAL MEDICINE

## 2022-11-09 PROCEDURE — 1101F PT FALLS ASSESS-DOCD LE1/YR: CPT | Mod: CPTII,,, | Performed by: INTERNAL MEDICINE

## 2022-11-09 RX ORDER — AZELASTINE 1 MG/ML
1 SPRAY, METERED NASAL 2 TIMES DAILY
Qty: 30 ML | Refills: 11 | Status: SHIPPED | OUTPATIENT
Start: 2022-11-09 | End: 2024-01-24

## 2022-11-09 NOTE — PROGRESS NOTES
Subjective:       Patient ID: Sandra Crowe is a 72 y.o. female.      Patient Care Team:  Karan Wyman II, MD as PCP - General (Internal Medicine)    Chief Complaint: Medicare AWV Follow Up, Hypertension, Back Pain, and Vitamin D Deficiency    72 year old female seen today for follow up of HTN among other conditions.     Review of Systems   Constitutional:  Negative for fever.   HENT:  Negative for nosebleeds.    Eyes:  Negative for visual disturbance.   Respiratory:  Negative for shortness of breath.    Cardiovascular:  Negative for chest pain.   Gastrointestinal:  Negative for abdominal pain.   Genitourinary:  Negative for dysuria.   Musculoskeletal:  Negative for gait problem.   Neurological:  Negative for headaches.         Patient Reported Health Risk Assessment  What [age] your [age] 70-79  Are you male or female?: Female  During the past four weeks, how much have you been bothered by emotional problems such as feeling anxious, depressed, irritable, sad, or downhearted and blue?: Not at all  During the past five weeks, has your physical and/or emotional health limited your social activities with family, friends, neighbors, or groups?: Not at all  During the past four weeks, how much bodily pain have you generally had?: Moderate pain  During the past four weeks, was someone available to help if you needed and wanted help?: Yes, as much as I wanted  During the past four weeks, what was the hardest physical activity you could do for at least two minutes?: Heavy  Can you get to places out of walking distance without help?  (For example, can you travel alone on buses or taxis, or drive your own car?): Yes  Can you go shopping for groceries or clothes without someone's help?: Yes  Can you prepare your own meals?: Yes  Can you do your own housework without help?: Yes  Because of any health problems, do you need the help of another person with your personal care needs such as eating, bathing, dressing, or getting  around the house?: No  Can you handle your own money without help?: Yes  During the past four weeks, how would you rate your health in general?: Good  How have things been going for you during the past four weeks?: Pretty well  Are you having difficulties driving your car?: No  Do you always fasten your seat belt when you are in a car?: Yes, usually  How often in the past four weeks have you been bothered by falling or dizzy when standing up?: Never  How often in the past four weeks have you been bothered by sexual problems?: Never  How often in the past four weeks have you been bothered by trouble eating well?: Never  How often in the past four weeks have you been bothered by teeth or denture problems?: Never  How often in the past four weeks have you been bothered with problems using the telephone?: Never  How often in the past four weeks have you been bothered by tiredness or fatigue?: Never  Have you fallen two or more times in the past year?: No  Are you afraid of falling?: No  Are you a smoker?: No  During the past four weeks, how many drinks of wine, beer, or other alcoholic beverages did you have?: No alcohol at all  Do you exercise for about 20 minutes three or more days a week?: Yes, some of the time  Have you been given any information to help you with hazards in your house that might hurt you?: No  Have you been given any information to help you with keeping track of your medications?: No  How often do you have trouble taking medicines the way you've been told to take them?: I always take them as prescribed  How confident are you that you can control and manage most of your health problems?: Somewhat confident  What is your race? (Check all that apply.):       Objective:      Physical Exam  HENT:      Head: Normocephalic.      Mouth/Throat:      Pharynx: Oropharynx is clear.   Eyes:      Extraocular Movements: Extraocular movements intact.   Cardiovascular:      Rate and Rhythm: Normal rate and  "regular rhythm.   Pulmonary:      Breath sounds: Normal breath sounds.   Abdominal:      Palpations: Abdomen is soft.   Musculoskeletal:         General: No swelling.   Skin:     General: Skin is warm.   Neurological:      General: No focal deficit present.      Mental Status: She is alert and oriented to person, place, and time.   Psychiatric:         Mood and Affect: Mood normal.       Vitals:    11/09/22 0854   BP: 126/72   Pulse: 68   Resp: 14   Temp: 97.8 °F (36.6 °C)   SpO2: 99%   Weight: 65.3 kg (144 lb)   Height: 5' 7" (1.702 m)            No flowsheet data found.  Fall Risk Assessment - Outpatient 11/9/2022 7/14/2022   Mobility Status Ambulatory Ambulatory   Number of falls 0 0   Identified as fall risk 0 0           Depression Screening  Over the past two weeks, has the patient felt down, depressed, or hopeless?: No  Over the past two weeks, has the patient felt little interest or pleasure in doing things?: No  Functional Ability/Safety Screening  Was the patient's timed Up & Go test unsteady or longer than 30 seconds?: No  Does the patient need help with phone, transportation, shopping, preparing meals, housework, laundry, meds, or managing money?: No  Does the patient's home have rugs in the hallway, lack grab bars in the bathroom, lack handrails on the stairs or have poor lighting?: No  Have you noticed any hearing difficulties?: No  Cognitive Function (Assessed through direct observation with due consideration of information obtained by way of patient reports and/or concerns raised by family, friends, caretakers, or others)    Does the patient repeat questions/statements in the same day?: No  Does the patient have trouble remembering the date, year, and time?: No  Does the patient have difficulty managing finances?: No  Does the patient have a decreased sense of direction?: No      Assessment:       Problem List Items Addressed This Visit          Neuro    Lumbar degenerative disc disease    Relevant " Orders    CBC Auto Differential    Comprehensive Metabolic Panel    Lipid Panel    Urinalysis, Reflex to Urine Culture Urine, Clean Catch    TSH    Vitamin D       Cardiac/Vascular    Primary hypertension    Relevant Orders    CBC Auto Differential    Comprehensive Metabolic Panel    Lipid Panel    Urinalysis, Reflex to Urine Culture Urine, Clean Catch    TSH    Vitamin D       Endocrine    Vitamin D deficiency    Relevant Orders    CBC Auto Differential    Comprehensive Metabolic Panel    Lipid Panel    Urinalysis, Reflex to Urine Culture Urine, Clean Catch    TSH    Vitamin D       Orthopedic    Chronic back pain greater than 3 months duration    Relevant Orders    CBC Auto Differential    Comprehensive Metabolic Panel    Lipid Panel    Urinalysis, Reflex to Urine Culture Urine, Clean Catch    TSH    Vitamin D       Other    Wellness examination - Primary    Relevant Orders    CBC Auto Differential    Comprehensive Metabolic Panel    Lipid Panel    Urinalysis, Reflex to Urine Culture Urine, Clean Catch    TSH    Vitamin D     Other Visit Diagnoses       Sinus complaint        Relevant Medications    azelastine (ASTELIN) 137 mcg (0.1 %) nasal spray    Other Relevant Orders    CBC Auto Differential    Comprehensive Metabolic Panel    Lipid Panel    Urinalysis, Reflex to Urine Culture Urine, Clean Catch    TSH    Vitamin D            Medication List with Changes/Refills   Current Medications    ALBUTEROL (VENTOLIN HFA) 90 MCG/ACTUATION INHALER    Inhale 2 puffs into the lungs every 6 (six) hours as needed for Wheezing.    BRINZOLAMIDE (AZOPT) 1 % OPHTHALMIC SUSPENSION    Place 1 drop into both eyes 3 (three) times daily.    CYCLOSPORINE (RESTASIS) 0.05 % OPHTHALMIC EMULSION    Place 1 drop into both eyes 2 (two) times daily.    LUMIGAN 0.01 % DROP    Place 1 drop into both eyes nightly.    RHOPRESSA 0.02 % OPHTHALMIC SOLUTION    Place 1 drop into both eyes once daily.    TRIAMTERENE-HYDROCHLOROTHIAZIDE 37.5-25 MG  (MAXZIDE-25) 37.5-25 MG PER TABLET    Take 1 tablet by mouth once daily.   Changed and/or Refilled Medications    Modified Medication Previous Medication    AZELASTINE (ASTELIN) 137 MCG (0.1 %) NASAL SPRAY azelastine (ASTELIN) 137 mcg (0.1 %) nasal spray       1 spray (137 mcg total) by Nasal route 2 (two) times daily.    1 spray (137 mcg total) by Nasal route 2 (two) times daily.   Discontinued Medications    TIZANIDINE (ZANAFLEX) 4 MG TABLET    Take 1 tablet (4 mg total) by mouth every 6 (six) hours as needed.        Plan:       1. Hypertension: Continue current medication. Stable    2. History of palpitations: She had extensive cardiac workup in 2012 with [facility]. No chest pain, shortness of breath, or palpitations recently. Ca score 0 in 2018 (repeat next year).    3. Ptosis: She was evaluated by Dr. Foreign Arevalo in 2021, surgery was not recommended.    4. Pulmonary nodules: Seen incidentally on calcium score in 2018. These are not concerning, and she has never smoked. We do not need any more imaging    5. Skin lesion: Referred to dermatology last time for skin lesion on the left leg    6. Chronic rhinosinusitis: Restart azelastine (glaucoma so don't use Flonase)    7. Wellness: C-scope in 11/2016 with one polyp, recommended repeating in 5 years; she was contacted by gastroenterology but has not gotten colonoscopy at. [facility] 8/2022, Pneumovax 2017. Flu shot today    She received Covid monoclonal antibody infusion in 10/2021 (tested negative, but partner was positive). She has been vaccinated already.    She worked at [employer], and she retired in 2019      Medicare Annual Wellness and Personalized Prevention Plan:   Fall Risk + Home Safety + Hearing Impairment + Depression Screen + Cognitive Impairment Screen + Health Risk Assessment all reviewed    Health Maintenance Topics with due status: Not Due       Topic Last Completion Date    Mammogram 08/24/2022    Lipid Panel 11/07/2022      The patient's Health  Maintenance was reviewed and the following appears to be due at this time:   Health Maintenance Due   Topic Date Due    Hepatitis C Screening  Never done    TETANUS VACCINE  Never done    Pneumococcal Vaccines (Age 65+) (2 - PCV) 11/09/2017    Colorectal Cancer Screening  11/11/2021    DEXA Scan  01/04/2022    Shingles Vaccine (2 of 2) 06/18/2022    COVID-19 Vaccine (4 - Booster for Pfizer series) 06/18/2022    Influenza Vaccine (1) 09/01/2022       Advance Care Planning   I attest to discussing Advance Care Planning with patient and/or family member.  Education was provided including the importance of the Health Care Power of , Advance Directives, and/or [place] documentation.  The patient expressed understanding to the importance of this information and discussion.  Length of ACP conversation in minutes: 2       Opioid Screening: Patient medication list reviewed, patient is not taking prescription opioids. Patient is not using additional opioids than prescribed. Patient is at low risk of substance abuse based on this opioid use history.     No follow-ups on file. In addition to their scheduled follow up, the patient has also been instructed to follow up on as needed basis.

## 2022-12-05 ENCOUNTER — TELEPHONE (OUTPATIENT)
Dept: INTERNAL MEDICINE | Facility: CLINIC | Age: 72
End: 2022-12-05
Payer: MEDICARE

## 2022-12-05 DIAGNOSIS — N39.0 URINARY TRACT INFECTION WITHOUT HEMATURIA, SITE UNSPECIFIED: Primary | ICD-10-CM

## 2022-12-05 RX ORDER — CIPROFLOXACIN 500 MG/1
500 TABLET ORAL 2 TIMES DAILY
Qty: 8 TABLET | Refills: 0 | Status: SHIPPED | OUTPATIENT
Start: 2022-12-05 | End: 2022-12-09

## 2022-12-05 NOTE — TELEPHONE ENCOUNTER
Karan Wyman II, MD  You 3 hours ago (12:43 PM)     Ciprofloxacin 500 mg twice a day for 4 days

## 2022-12-05 NOTE — TELEPHONE ENCOUNTER
----- Message from Hoda Byers sent at 12/5/2022  9:03 AM CST -----  Symptoms of a bladder infection,  Painful UA, bladder spasms, urge incontinence  Uses [facility] on [address]    [phone]

## 2023-02-13 PROBLEM — Z00.00 WELLNESS EXAMINATION: Status: RESOLVED | Noted: 2022-11-09 | Resolved: 2023-02-13

## 2023-03-06 ENCOUNTER — LAB VISIT (OUTPATIENT)
Dept: LAB | Facility: HOSPITAL | Age: 73
End: 2023-03-06
Attending: INTERNAL MEDICINE
Payer: MEDICARE

## 2023-03-06 ENCOUNTER — TELEPHONE (OUTPATIENT)
Dept: INTERNAL MEDICINE | Facility: CLINIC | Age: 73
End: 2023-03-06
Payer: MEDICARE

## 2023-03-06 DIAGNOSIS — R39.15 URGENCY OF URINATION: ICD-10-CM

## 2023-03-06 DIAGNOSIS — R39.15 URGENCY OF URINATION: Primary | ICD-10-CM

## 2023-03-06 LAB
APPEARANCE UR: CLEAR
BACTERIA #/AREA URNS AUTO: ABNORMAL /HPF
BILIRUB UR QL STRIP.AUTO: NEGATIVE MG/DL
COLOR UR AUTO: ABNORMAL
GLUCOSE UR QL STRIP.AUTO: NEGATIVE MG/DL
KETONES UR QL STRIP.AUTO: NEGATIVE MG/DL
LEUKOCYTE ESTERASE UR QL STRIP.AUTO: ABNORMAL UNIT/L
NITRITE UR QL STRIP.AUTO: POSITIVE
PH UR STRIP.AUTO: 7.5 [PH]
PROT UR QL STRIP.AUTO: NEGATIVE MG/DL
RBC #/AREA URNS AUTO: <5 /HPF
RBC UR QL AUTO: NEGATIVE UNIT/L
SP GR UR STRIP.AUTO: 1.01 (ref 1–1.03)
SQUAMOUS #/AREA URNS AUTO: <5 /HPF
UROBILINOGEN UR STRIP-ACNC: 1 MG/DL
WBC #/AREA URNS AUTO: 68 /HPF

## 2023-03-06 PROCEDURE — 87186 SC STD MICRODIL/AGAR DIL: CPT

## 2023-03-06 PROCEDURE — 87088 URINE BACTERIA CULTURE: CPT

## 2023-03-06 PROCEDURE — 81001 URINALYSIS AUTO W/SCOPE: CPT

## 2023-03-07 ENCOUNTER — TELEPHONE (OUTPATIENT)
Dept: INTERNAL MEDICINE | Facility: CLINIC | Age: 73
End: 2023-03-07
Payer: MEDICARE

## 2023-03-07 DIAGNOSIS — R39.15 URGENCY OF URINATION: Primary | ICD-10-CM

## 2023-03-07 RX ORDER — CIPROFLOXACIN 500 MG/1
500 TABLET ORAL 2 TIMES DAILY
Qty: 8 TABLET | Refills: 0 | Status: SHIPPED | OUTPATIENT
Start: 2023-03-07 | End: 2023-03-11

## 2023-03-07 NOTE — TELEPHONE ENCOUNTER
----- Message from Karan Wyman II, MD sent at 3/7/2023  9:42 AM CST -----  For urinary tract infection, start ciprofloxacin 500 mg twice a day for 4 days

## 2023-03-08 LAB — BACTERIA UR CULT: ABNORMAL

## 2023-04-24 ENCOUNTER — TELEPHONE (OUTPATIENT)
Dept: INTERNAL MEDICINE | Facility: CLINIC | Age: 73
End: 2023-04-24
Payer: MEDICARE

## 2023-04-24 DIAGNOSIS — R39.15 URGENCY OF URINATION: Primary | ICD-10-CM

## 2023-04-24 RX ORDER — CIPROFLOXACIN 500 MG/1
500 TABLET ORAL 2 TIMES DAILY
Qty: 8 TABLET | Refills: 0 | Status: SHIPPED | OUTPATIENT
Start: 2023-04-24 | End: 2023-04-28

## 2023-05-17 ENCOUNTER — TELEPHONE (OUTPATIENT)
Dept: INTERNAL MEDICINE | Facility: CLINIC | Age: 73
End: 2023-05-17
Payer: MEDICARE

## 2023-05-17 DIAGNOSIS — R39.15 URGENCY OF URINATION: Primary | ICD-10-CM

## 2023-05-17 RX ORDER — CIPROFLOXACIN 500 MG/1
500 TABLET ORAL 2 TIMES DAILY
Qty: 10 TABLET | Refills: 0 | Status: SHIPPED | OUTPATIENT
Start: 2023-05-17 | End: 2023-05-22

## 2023-05-17 NOTE — TELEPHONE ENCOUNTER
Feels like UTI is back in full force, burning, frequency, urgency. Would like something called out     PH  [phone]

## 2023-07-13 ENCOUNTER — TELEPHONE (OUTPATIENT)
Dept: INTERNAL MEDICINE | Facility: CLINIC | Age: 73
End: 2023-07-13
Payer: MEDICARE

## 2023-07-13 DIAGNOSIS — Z12.31 OTHER SCREENING MAMMOGRAM: Primary | ICD-10-CM

## 2023-07-13 NOTE — TELEPHONE ENCOUNTER
----- Message from Sylviaisabela Wheat sent at 7/13/2023  3:52 PM CDT -----  Regarding: advice  Type:  Needs Medical Advice    Who Called: pt  Symptoms (please be specific):    How long has patient had these symptoms:    Pharmacy name and phone #:    Would the patient rather a call back or a response via [facility]?   Best Call Back Number: [phone]  Additional Information: pt called about needing an order for her to have her mammo done at the breast center [facility]. She stated that she needed it faxed to them.

## 2023-09-05 ENCOUNTER — TELEPHONE (OUTPATIENT)
Dept: INTERNAL MEDICINE | Facility: CLINIC | Age: 73
End: 2023-09-05

## 2023-09-05 ENCOUNTER — OFFICE VISIT (OUTPATIENT)
Dept: INTERNAL MEDICINE | Facility: CLINIC | Age: 73
End: 2023-09-05
Payer: MEDICARE

## 2023-09-05 VITALS
WEIGHT: 144 LBS | BODY MASS INDEX: 22.6 KG/M2 | SYSTOLIC BLOOD PRESSURE: 110 MMHG | TEMPERATURE: 98 F | HEIGHT: 67 IN | RESPIRATION RATE: 14 BRPM | HEART RATE: 70 BPM | OXYGEN SATURATION: 98 % | DIASTOLIC BLOOD PRESSURE: 80 MMHG

## 2023-09-05 DIAGNOSIS — R30.0 DYSURIA: Primary | ICD-10-CM

## 2023-09-05 DIAGNOSIS — Z86.010 HISTORY OF COLONIC POLYPS: ICD-10-CM

## 2023-09-05 DIAGNOSIS — N30.00 ACUTE CYSTITIS WITHOUT HEMATURIA: ICD-10-CM

## 2023-09-05 LAB
BILIRUB SERPL-MCNC: NEGATIVE MG/DL
BLOOD URINE, POC: NORMAL
CLARITY, POC UA: NORMAL
COLOR, POC UA: YELLOW
GLUCOSE UR QL STRIP: NEGATIVE
KETONES UR QL STRIP: NEGATIVE
LEUKOCYTE ESTERASE URINE, POC: NORMAL
NITRITE, POC UA: POSITIVE
PH, POC UA: 5
PROTEIN, POC: NORMAL
SPECIFIC GRAVITY, POC UA: 1
UROBILINOGEN, POC UA: 0.2

## 2023-09-05 PROCEDURE — 99213 PR OFFICE/OUTPT VISIT, EST, LEVL III, 20-29 MIN: ICD-10-PCS | Mod: ,,, | Performed by: INTERNAL MEDICINE

## 2023-09-05 PROCEDURE — 1160F PR REVIEW ALL MEDS BY PRESCRIBER/CLIN PHARMACIST DOCUMENTED: ICD-10-PCS | Mod: CPTII,,, | Performed by: INTERNAL MEDICINE

## 2023-09-05 PROCEDURE — 3288F PR FALLS RISK ASSESSMENT DOCUMENTED: ICD-10-PCS | Mod: CPTII,,, | Performed by: INTERNAL MEDICINE

## 2023-09-05 PROCEDURE — 1101F PT FALLS ASSESS-DOCD LE1/YR: CPT | Mod: CPTII,,, | Performed by: INTERNAL MEDICINE

## 2023-09-05 PROCEDURE — 3288F FALL RISK ASSESSMENT DOCD: CPT | Mod: CPTII,,, | Performed by: INTERNAL MEDICINE

## 2023-09-05 PROCEDURE — 1160F RVW MEDS BY RX/DR IN RCRD: CPT | Mod: CPTII,,, | Performed by: INTERNAL MEDICINE

## 2023-09-05 PROCEDURE — 1159F PR MEDICATION LIST DOCUMENTED IN MEDICAL RECORD: ICD-10-PCS | Mod: CPTII,,, | Performed by: INTERNAL MEDICINE

## 2023-09-05 PROCEDURE — 81002 URINALYSIS NONAUTO W/O SCOPE: CPT | Mod: ,,, | Performed by: INTERNAL MEDICINE

## 2023-09-05 PROCEDURE — 3074F SYST BP LT 130 MM HG: CPT | Mod: CPTII,,, | Performed by: INTERNAL MEDICINE

## 2023-09-05 PROCEDURE — 3079F DIAST BP 80-89 MM HG: CPT | Mod: CPTII,,, | Performed by: INTERNAL MEDICINE

## 2023-09-05 PROCEDURE — 1159F MED LIST DOCD IN RCRD: CPT | Mod: CPTII,,, | Performed by: INTERNAL MEDICINE

## 2023-09-05 PROCEDURE — 3074F PR MOST RECENT SYSTOLIC BLOOD PRESSURE < 130 MM HG: ICD-10-PCS | Mod: CPTII,,, | Performed by: INTERNAL MEDICINE

## 2023-09-05 PROCEDURE — 81002 POCT URINE DIPSTICK WITHOUT MICROSCOPE: ICD-10-PCS | Mod: ,,, | Performed by: INTERNAL MEDICINE

## 2023-09-05 PROCEDURE — 3079F PR MOST RECENT DIASTOLIC BLOOD PRESSURE 80-89 MM HG: ICD-10-PCS | Mod: CPTII,,, | Performed by: INTERNAL MEDICINE

## 2023-09-05 PROCEDURE — 1101F PR PT FALLS ASSESS DOC 0-1 FALLS W/OUT INJ PAST YR: ICD-10-PCS | Mod: CPTII,,, | Performed by: INTERNAL MEDICINE

## 2023-09-05 PROCEDURE — 99213 OFFICE O/P EST LOW 20 MIN: CPT | Mod: ,,, | Performed by: INTERNAL MEDICINE

## 2023-09-05 RX ORDER — CYCLOSPORINE 0 G/ML
1 SOLUTION/ DROPS OPHTHALMIC; TOPICAL 2 TIMES DAILY
COMMUNITY
Start: 2023-07-07

## 2023-09-05 RX ORDER — DORZOLAMIDE HCL 20 MG/ML
1 SOLUTION/ DROPS OPHTHALMIC 3 TIMES DAILY
COMMUNITY
Start: 2023-08-16 | End: 2023-11-17

## 2023-09-05 RX ORDER — CIPROFLOXACIN 500 MG/1
500 TABLET ORAL 2 TIMES DAILY
Qty: 10 TABLET | Refills: 0 | Status: SHIPPED | OUTPATIENT
Start: 2023-09-05 | End: 2023-09-10

## 2023-09-05 RX ORDER — ALBUTEROL SULFATE 90 UG/1
2 AEROSOL, METERED RESPIRATORY (INHALATION) EVERY 6 HOURS PRN
Qty: 18 G | Refills: 0 | Status: SHIPPED | OUTPATIENT
Start: 2023-09-05 | End: 2023-10-25

## 2023-09-05 NOTE — PROGRESS NOTES
"Subjective:       Patient ID: Sandra Crowe is a 73 y.o. female.    Chief Complaint: Dysuria    73-year-old female reports lower abdominal pressure and dysuria over the past few days.  She has a history of recurrent urinary tract infections.      Review of Systems   Constitutional:  Negative for fever.   HENT:  Negative for nosebleeds.    Eyes:  Negative for visual disturbance.   Respiratory:  Negative for shortness of breath.    Cardiovascular:  Negative for chest pain.   Gastrointestinal:  Negative for abdominal pain.   Genitourinary:  Positive for dysuria.   Musculoskeletal:  Negative for gait problem.   Neurological:  Negative for headaches.         Objective:      Physical Exam  HENT:      Head: Normocephalic.      Mouth/Throat:      Pharynx: Oropharynx is clear.   Eyes:      Extraocular Movements: Extraocular movements intact.   Cardiovascular:      Rate and Rhythm: Normal rate and regular rhythm.   Pulmonary:      Breath sounds: Normal breath sounds.   Abdominal:      Palpations: Abdomen is soft.   Musculoskeletal:         General: No swelling.   Skin:     General: Skin is warm.   Neurological:      General: No focal deficit present.      Mental Status: She is alert and oriented to person, place, and time.   Psychiatric:         Mood and Affect: Mood normal.         Vitals:    09/05/23 1104   BP: 110/80   Pulse: 70   Resp: 14   Temp: 98.4 °F (36.9 °C)   SpO2: 98%   Weight: 65.3 kg (144 lb)   Height: 5' 7" (1.702 m)      Assessment:       Problem List Items Addressed This Visit    None  Visit Diagnoses       Dysuria    -  Primary    Relevant Orders    POCT URINE DIPSTICK WITHOUT MICROSCOPE (Completed)    Acute cystitis without hematuria                Medication List with Changes/Refills   New Medications    CIPROFLOXACIN HCL (CIPRO) 500 MG TABLET    Take 1 tablet (500 mg total) by mouth 2 (two) times daily. for 5 days   Current Medications    AZELASTINE (ASTELIN) 137 MCG (0.1 %) NASAL SPRAY    1 spray (137 " mcg total) by Nasal route 2 (two) times daily.    CEQUA 0.09 % DPET    Place 1 drop into both eyes 2 (two) times daily.    DORZOLAMIDE (TRUSOPT) 2 % OPHTHALMIC SOLUTION    Place 1 drop into both eyes 3 (three) times daily.    LUMIGAN 0.01 % DROP    Place 1 drop into both eyes nightly.    TRIAMTERENE-HYDROCHLOROTHIAZIDE 37.5-25 MG (MAXZIDE-25) 37.5-25 MG PER TABLET    Take 1 tablet by mouth once daily   Changed and/or Refilled Medications    Modified Medication Previous Medication    ALBUTEROL (VENTOLIN HFA) 90 MCG/ACTUATION INHALER albuterol (VENTOLIN HFA) 90 mcg/actuation inhaler       Inhale 2 puffs into the lungs every 6 (six) hours as needed for Wheezing.    Inhale 2 puffs into the lungs every 6 (six) hours as needed for Wheezing.   Discontinued Medications    BRINZOLAMIDE (AZOPT) 1 % OPHTHALMIC SUSPENSION    Place 1 drop into both eyes 3 (three) times daily.    CYCLOSPORINE (RESTASIS) 0.05 % OPHTHALMIC EMULSION    Place 1 drop into both eyes 2 (two) times daily.    RHOPRESSA 0.02 % OPHTHALMIC SOLUTION    Place 1 drop into both eyes once daily.        Plan:       1.  Recurrent urinary tract infections:  Start ciprofloxacin for 5 days.  I offered to refer to Urology, but she would like to think about it 1st  2.  Colon cancer screening: Refer for colonoscopy

## 2023-09-05 NOTE — TELEPHONE ENCOUNTER
----- Message from Lila Olguin sent at 9/5/2023  9:05 AM CDT -----  Regarding: med refill  .Type:  RX Refill Request    Who Called: pt  Refill or New Rx:refill  RX Name and Strength:ciprofloxacin HCl (CIPRO) 500 MG tablet  How is the patient currently taking it? (ex. 1XDay):2xday  Is this a 30 day or 90 day RX:10  Preferred Pharmacy with phone number:[facility] [address] [place] [address]   Phone: [phone]  Fax: [phone]  Local or Mail Order:local  Ordering Provider:Zamzam  Would the patient rather a call back or a response via [facility]? Call back  Best Call Back Number:[phone]  Additional Information: pt needs authorization from  to refill prescription

## 2023-09-08 LAB — BCS RECOMMENDATION EXT: NORMAL

## 2023-09-11 ENCOUNTER — PATIENT MESSAGE (OUTPATIENT)
Dept: INTERNAL MEDICINE | Facility: CLINIC | Age: 73
End: 2023-09-11
Payer: MEDICARE

## 2023-09-11 ENCOUNTER — DOCUMENTATION ONLY (OUTPATIENT)
Dept: INTERNAL MEDICINE | Facility: CLINIC | Age: 73
End: 2023-09-11
Payer: MEDICARE

## 2023-10-25 DIAGNOSIS — I10 PRIMARY HYPERTENSION: ICD-10-CM

## 2023-10-25 DIAGNOSIS — Z00.00 WELL ADULT EXAM: Primary | ICD-10-CM

## 2023-10-25 RX ORDER — ALBUTEROL SULFATE 90 UG/1
2 AEROSOL, METERED RESPIRATORY (INHALATION) EVERY 6 HOURS PRN
Qty: 18 G | Refills: 1 | Status: SHIPPED | OUTPATIENT
Start: 2023-10-25

## 2023-10-25 RX ORDER — ALBUTEROL SULFATE 90 UG/1
2 AEROSOL, METERED RESPIRATORY (INHALATION) EVERY 6 HOURS PRN
Qty: 18 G | Refills: 0 | Status: SHIPPED | OUTPATIENT
Start: 2023-10-25 | End: 2023-10-25

## 2023-10-25 RX ORDER — TRIAMTERENE/HYDROCHLOROTHIAZID 37.5-25 MG
1 TABLET ORAL DAILY
Qty: 90 TABLET | Refills: 0 | Status: SHIPPED | OUTPATIENT
Start: 2023-10-25 | End: 2024-02-05

## 2023-11-13 ENCOUNTER — LAB VISIT (OUTPATIENT)
Dept: LAB | Facility: HOSPITAL | Age: 73
End: 2023-11-13
Attending: INTERNAL MEDICINE
Payer: MEDICARE

## 2023-11-13 DIAGNOSIS — E55.9 VITAMIN D DEFICIENCY: ICD-10-CM

## 2023-11-13 DIAGNOSIS — M54.9 CHRONIC BACK PAIN GREATER THAN 3 MONTHS DURATION: ICD-10-CM

## 2023-11-13 DIAGNOSIS — Z00.00 WELLNESS EXAMINATION: ICD-10-CM

## 2023-11-13 DIAGNOSIS — R09.89 SINUS COMPLAINT: ICD-10-CM

## 2023-11-13 DIAGNOSIS — M51.36 LUMBAR DEGENERATIVE DISC DISEASE: ICD-10-CM

## 2023-11-13 DIAGNOSIS — I10 PRIMARY HYPERTENSION: ICD-10-CM

## 2023-11-13 DIAGNOSIS — G89.29 CHRONIC BACK PAIN GREATER THAN 3 MONTHS DURATION: ICD-10-CM

## 2023-11-13 LAB
ALBUMIN SERPL-MCNC: 4.3 G/DL (ref 3.4–4.8)
ALBUMIN/GLOB SERPL: 1.7 RATIO (ref 1.1–2)
ALP SERPL-CCNC: 83 UNIT/L (ref 40–150)
ALT SERPL-CCNC: 22 UNIT/L (ref 0–55)
APPEARANCE UR: CLEAR
AST SERPL-CCNC: 29 UNIT/L (ref 5–34)
BACTERIA #/AREA URNS AUTO: ABNORMAL /HPF
BASOPHILS # BLD AUTO: 0.07 X10(3)/MCL
BASOPHILS NFR BLD AUTO: 1 %
BILIRUB SERPL-MCNC: 0.6 MG/DL
BILIRUB UR QL STRIP.AUTO: NEGATIVE
BUN SERPL-MCNC: 16.6 MG/DL (ref 9.8–20.1)
CALCIUM SERPL-MCNC: 9.9 MG/DL (ref 8.4–10.2)
CHLORIDE SERPL-SCNC: 105 MMOL/L (ref 98–107)
CHOLEST SERPL-MCNC: 208 MG/DL
CHOLEST/HDLC SERPL: 4 {RATIO} (ref 0–5)
CO2 SERPL-SCNC: 29 MMOL/L (ref 23–31)
COLOR UR AUTO: ABNORMAL
CREAT SERPL-MCNC: 0.81 MG/DL (ref 0.55–1.02)
DEPRECATED CALCIDIOL+CALCIFEROL SERPL-MC: 42.6 NG/ML (ref 30–80)
EOSINOPHIL # BLD AUTO: 0.33 X10(3)/MCL (ref 0–0.9)
EOSINOPHIL NFR BLD AUTO: 4.8 %
ERYTHROCYTE [DISTWIDTH] IN BLOOD BY AUTOMATED COUNT: 13.6 % (ref 11.5–17)
GFR SERPLBLD CREATININE-BSD FMLA CKD-EPI: >60 MLS/MIN/1.73/M2
GLOBULIN SER-MCNC: 2.6 GM/DL (ref 2.4–3.5)
GLUCOSE SERPL-MCNC: 101 MG/DL (ref 82–115)
GLUCOSE UR QL STRIP.AUTO: NORMAL
HCT VFR BLD AUTO: 46.4 % (ref 37–47)
HDLC SERPL-MCNC: 56 MG/DL (ref 35–60)
HGB BLD-MCNC: 15 G/DL (ref 12–16)
IMM GRANULOCYTES # BLD AUTO: 0.01 X10(3)/MCL (ref 0–0.04)
IMM GRANULOCYTES NFR BLD AUTO: 0.1 %
KETONES UR QL STRIP.AUTO: NEGATIVE
LDLC SERPL CALC-MCNC: 139 MG/DL (ref 50–140)
LEUKOCYTE ESTERASE UR QL STRIP.AUTO: NEGATIVE
LYMPHOCYTES # BLD AUTO: 2.46 X10(3)/MCL (ref 0.6–4.6)
LYMPHOCYTES NFR BLD AUTO: 36 %
MCH RBC QN AUTO: 30.5 PG (ref 27–31)
MCHC RBC AUTO-ENTMCNC: 32.3 G/DL (ref 33–36)
MCV RBC AUTO: 94.3 FL (ref 80–94)
MONOCYTES # BLD AUTO: 0.65 X10(3)/MCL (ref 0.1–1.3)
MONOCYTES NFR BLD AUTO: 9.5 %
MUCOUS THREADS URNS QL MICRO: ABNORMAL /LPF
NEUTROPHILS # BLD AUTO: 3.32 X10(3)/MCL (ref 2.1–9.2)
NEUTROPHILS NFR BLD AUTO: 48.6 %
NITRITE UR QL STRIP.AUTO: NEGATIVE
NRBC BLD AUTO-RTO: 0 %
PH UR STRIP.AUTO: 7.5 [PH]
PLATELET # BLD AUTO: 245 X10(3)/MCL (ref 130–400)
PMV BLD AUTO: 10.7 FL (ref 7.4–10.4)
POTASSIUM SERPL-SCNC: 4 MMOL/L (ref 3.5–5.1)
PROT SERPL-MCNC: 6.9 GM/DL (ref 5.8–7.6)
PROT UR QL STRIP.AUTO: NEGATIVE
RBC # BLD AUTO: 4.92 X10(6)/MCL (ref 4.2–5.4)
RBC #/AREA URNS AUTO: ABNORMAL /HPF
RBC UR QL AUTO: NEGATIVE
SODIUM SERPL-SCNC: 141 MMOL/L (ref 136–145)
SP GR UR STRIP.AUTO: 1.02 (ref 1–1.03)
SQUAMOUS #/AREA URNS LPF: ABNORMAL /HPF
TRIGL SERPL-MCNC: 63 MG/DL (ref 37–140)
TSH SERPL-ACNC: 1.13 UIU/ML (ref 0.35–4.94)
UROBILINOGEN UR STRIP-ACNC: NORMAL
VLDLC SERPL CALC-MCNC: 13 MG/DL
WBC # SPEC AUTO: 6.84 X10(3)/MCL (ref 4.5–11.5)
WBC #/AREA URNS AUTO: ABNORMAL /HPF

## 2023-11-13 PROCEDURE — 82306 VITAMIN D 25 HYDROXY: CPT

## 2023-11-13 PROCEDURE — 80053 COMPREHEN METABOLIC PANEL: CPT

## 2023-11-13 PROCEDURE — 36415 COLL VENOUS BLD VENIPUNCTURE: CPT

## 2023-11-13 PROCEDURE — 84443 ASSAY THYROID STIM HORMONE: CPT

## 2023-11-13 PROCEDURE — 81001 URINALYSIS AUTO W/SCOPE: CPT

## 2023-11-13 PROCEDURE — 80061 LIPID PANEL: CPT

## 2023-11-13 PROCEDURE — 85025 COMPLETE CBC W/AUTO DIFF WBC: CPT

## 2023-11-16 NOTE — PROGRESS NOTES
Subjective:       Patient ID: Sandra Crowe is a 73 y.o. female.      Patient Care Team:  Karan Wyman II, MD as PCP - General (Internal Medicine)    Chief Complaint: Medicare AWV Follow Up, Hypertension, and Vitamin D Deficiency    73 year old female seen today for follow up of HTN among other conditions.       Review of Systems   Constitutional:  Negative for fever.   HENT:  Negative for nosebleeds.    Eyes:  Negative for visual disturbance.   Respiratory:  Negative for shortness of breath.    Cardiovascular:  Negative for chest pain.   Gastrointestinal:  Negative for abdominal pain.   Genitourinary:  Negative for dysuria.   Musculoskeletal:  Negative for gait problem.   Neurological:  Negative for headaches.           Patient Reported Health Risk Assessment  What [age] 70-79  Are you male or female?: Female  During the past four weeks, how much have you been bothered by emotional problems such as feeling anxious, depressed, irritable, sad, or downhearted and blue?: Not at all  During the past five weeks, has your physical and/or emotional health limited your social activities with family, friends, neighbors, or groups?: Not at all  During the past four weeks, how much bodily pain have you generally had?: No pain  During the past four weeks, was someone available to help if you needed and wanted help?: Yes, as much as I wanted  During the past four weeks, what was the hardest physical activity you could do for at least two minutes?: Moderate  Can you get to places out of walking distance without help?  (For example, can you travel alone on buses or taxis, or drive your own car?): Yes  Can you go shopping for groceries or clothes without someone's help?: Yes  Can you prepare your own meals?: Yes  Can you do your own housework without help?: Yes  Because of any health problems, do you need the help of another person with your personal care needs such as eating, bathing, dressing, or getting around the  house?: No  Can you handle your own money without help?: Yes  During the past four weeks, how would you rate your health in general?: Good  How have things been going for you during the past four weeks?: Very well  Are you having difficulties driving your car?: No  Do you always fasten your seat belt when you are in a car?: Yes, usually  How often in the past four weeks have you been bothered by falling or dizzy when standing up?: Never  How often in the past four weeks have you been bothered by sexual problems?: Never  How often in the past four weeks have you been bothered by trouble eating well?: Never  How often in the past four weeks have you been bothered by teeth or denture problems?: Never  How often in the past four weeks have you been bothered with problems using the telephone?: Never  How often in the past four weeks have you been bothered by tiredness or fatigue?: Never  Have you fallen two or more times in the past year?: No  Are you afraid of falling?: No  Are you a smoker?: No  During the past four weeks, how many drinks of wine, beer, or other alcoholic beverages did you have?: No alcohol at all  Do you exercise for about 20 minutes three or more days a week?: Yes, most of the time  Have you been given any information to help you with hazards in your house that might hurt you?: No  Have you been given any information to help you with keeping track of your medications?: No  How often do you have trouble taking medicines the way you've been told to take them?: I always take them as prescribed  How confident are you that you can control and manage most of your health problems?: Somewhat confident  What is your race? (Check all that apply.):       Objective:      Physical Exam  HENT:      Head: Normocephalic.      Mouth/Throat:      Pharynx: Oropharynx is clear.   Eyes:      Extraocular Movements: Extraocular movements intact.   Cardiovascular:      Rate and Rhythm: Normal rate and regular  "rhythm.   Pulmonary:      Breath sounds: Normal breath sounds.   Abdominal:      Palpations: Abdomen is soft.   Musculoskeletal:         General: No swelling.   Skin:     General: Skin is warm.   Neurological:      General: No focal deficit present.      Mental Status: She is alert and oriented to person, place, and time.   Psychiatric:         Mood and Affect: Mood normal.         Vitals:    11/17/23 0821   BP: 132/78   Pulse: 64   Resp: 16   Temp: 98.2 °F (36.8 °C)   SpO2: 99%   Weight: 64.4 kg (142 lb)   Height: 5' 7" (1.702 m)                 No data to display                  11/17/2023     8:45 AM 9/5/2023    11:00 AM 11/9/2022     9:00 AM 7/14/2022     9:45 AM   Fall Risk Assessment - Outpatient   Mobility Status Ambulatory Ambulatory Ambulatory Ambulatory   Number of falls 0 0 0 0   Identified as fall risk False False False False           Depression Screening  Over the past two weeks, has the patient felt down, depressed, or hopeless?: No  Over the past two weeks, has the patient felt little interest or pleasure in doing things?: No  Functional Ability/Safety Screening  Was the patient's timed Up & Go test unsteady or longer than 30 seconds?: No  Does the patient need help with phone, transportation, shopping, preparing meals, housework, laundry, meds, or managing money?: No  Does the patient's home have rugs in the hallway, lack grab bars in the bathroom, lack handrails on the stairs or have poor lighting?: No  Have you noticed any hearing difficulties?: No  Cognitive Function (Assessed through direct observation with due consideration of information obtained by way of patient reports and/or concerns raised by family, friends, caretakers, or others)    Does the patient repeat questions/statements in the same day?: No  Does the patient have trouble remembering the date, year, and time?: No  Does the patient have difficulty managing finances?: No  Does the patient have a decreased sense of direction?: " No      Assessment:       Problem List Items Addressed This Visit          Neuro    Lumbar degenerative disc disease    Relevant Orders    CBC Auto Differential    Comprehensive Metabolic Panel    Lipid Panel    Urinalysis, Reflex to Urine Culture    TSH    Vitamin D       Cardiac/Vascular    Primary hypertension    Relevant Orders    CBC Auto Differential    Comprehensive Metabolic Panel    Lipid Panel    Urinalysis, Reflex to Urine Culture    TSH    Vitamin D       Endocrine    Vitamin D deficiency    Relevant Orders    CBC Auto Differential    Comprehensive Metabolic Panel    Lipid Panel    Urinalysis, Reflex to Urine Culture    TSH    Vitamin D       Orthopedic    Chronic back pain greater than 3 months duration    Relevant Orders    CBC Auto Differential    Comprehensive Metabolic Panel    Lipid Panel    Urinalysis, Reflex to Urine Culture    TSH    Vitamin D       Other    RESOLVED: Wellness examination - Primary    Relevant Orders    CBC Auto Differential    Comprehensive Metabolic Panel    Lipid Panel    Urinalysis, Reflex to Urine Culture    TSH    Vitamin D       Medication List with Changes/Refills   Current Medications    ALBUTEROL (PROVENTIL HFA) 90 MCG/ACTUATION INHALER    Inhale 2 puffs into the lungs every 6 (six) hours as needed for Wheezing. Rescue    AZELASTINE (ASTELIN) 137 MCG (0.1 %) NASAL SPRAY    1 spray (137 mcg total) by Nasal route 2 (two) times daily.    BRINZOLAMIDE (AZOPT) 1 % OPHTHALMIC SUSPENSION    Place 1 drop into both eyes 3 (three) times daily.    CEQUA 0.09 % DPET    Place 1 drop into both eyes 2 (two) times daily.    LUMIGAN 0.01 % DROP    Place 1 drop into both eyes nightly.    RHOPRESSA 0.02 % OPHTHALMIC SOLUTION    Place 1 drop into both eyes every evening.    TRIAMTERENE-HYDROCHLOROTHIAZIDE 37.5-25 MG (MAXZIDE-25) 37.5-25 MG PER TABLET    Take 1 tablet by mouth once daily   Discontinued Medications    DORZOLAMIDE (TRUSOPT) 2 % OPHTHALMIC SOLUTION    Place 1 drop into both  eyes 3 (three) times daily.        Plan:       1. Hypertension: Continue current medication. Stable    2. History of palpitations: She had extensive cardiac workup in 2012 with [facility]. No chest pain, shortness of breath, or palpitations recently. Ca score 0 in 2018, repeat Ca score    3. Ptosis: She was evaluated by Dr. Foreign Arevalo in 2021, surgery was not recommended. Follow[facility] Segovia [facility]    4. Pulmonary nodules: Seen incidentally on calcium score in 2018. These are not concerning, and she has never smoked. We do not need any more imaging    5. Skin lesion: Referred to dermatology last time for skin lesion on the left leg    6. Chronic rhinosinusitis: Restart azelastine (glaucoma so don't use Flonase)    7. Wellness: C-scope in 11/2016 with one polyp, repeat in 5 years (scheduled for this month); she was contacted by gastroenterology but has not gotten colonoscopy at. [facility] 9/2023, Pneumovax 2017. Schedule bone density. Flu shot today      She worked at [employer], and she retired in 2019      Medicare Annual Wellness and Personalized Prevention Plan:   Fall Risk + Home Safety + Hearing Impairment + Depression Screen + Cognitive Impairment Screen + Health Risk Assessment all reviewed    Health Maintenance Topics with due status: Not Due       Topic Last Completion Date    Lipid Panel 11/13/2023      The patient's Health Maintenance was reviewed and the following appears to be due at this time:   Health Maintenance Due   Topic Date Due    Hepatitis C Screening  Never done    TETANUS VACCINE  Never done    RSV Vaccine (Age 60+) (1 - 1-dose 60+ series) Never done    Pneumococcal Vaccines (Age 65+) (2 - PCV) 11/09/2017    Colorectal Cancer Screening  11/11/2021    DEXA Scan  01/04/2022    Influenza Vaccine (1) 09/01/2023    COVID-19 Vaccine (6 - 2023-24 season) 09/01/2023       Advance Care Planning   I attest to discussing Advance Care Planning with patient and/or family member.  Education was provided  including the importance of the Health Care Power of , Advance Directives, and/or [place] documentation.  The patient expressed understanding to the importance of this information and discussion.  Length of ACP conversation in minutes: 1       Opioid Screening: Patient medication list reviewed, patient is not taking prescription opioids. Patient is not using additional opioids than prescribed. Patient is at low risk of substance abuse based on this opioid use history.     No follow-ups on file. In addition to their scheduled follow up, the patient has also been instructed to follow up on as needed basis.

## 2023-11-17 ENCOUNTER — OFFICE VISIT (OUTPATIENT)
Dept: INTERNAL MEDICINE | Facility: CLINIC | Age: 73
End: 2023-11-17
Payer: MEDICARE

## 2023-11-17 VITALS
HEART RATE: 64 BPM | BODY MASS INDEX: 22.29 KG/M2 | TEMPERATURE: 98 F | RESPIRATION RATE: 16 BRPM | HEIGHT: 67 IN | WEIGHT: 142 LBS | SYSTOLIC BLOOD PRESSURE: 132 MMHG | DIASTOLIC BLOOD PRESSURE: 78 MMHG | OXYGEN SATURATION: 99 %

## 2023-11-17 DIAGNOSIS — I10 PRIMARY HYPERTENSION: ICD-10-CM

## 2023-11-17 DIAGNOSIS — M51.36 LUMBAR DEGENERATIVE DISC DISEASE: ICD-10-CM

## 2023-11-17 DIAGNOSIS — Z13.6 ENCOUNTER FOR SCREENING FOR CARDIOVASCULAR DISORDERS: ICD-10-CM

## 2023-11-17 DIAGNOSIS — Z23 NEED FOR VACCINATION: ICD-10-CM

## 2023-11-17 DIAGNOSIS — Z00.00 WELLNESS EXAMINATION: Primary | ICD-10-CM

## 2023-11-17 DIAGNOSIS — Z78.0 POSTMENOPAUSAL: ICD-10-CM

## 2023-11-17 DIAGNOSIS — G89.29 CHRONIC BACK PAIN GREATER THAN 3 MONTHS DURATION: ICD-10-CM

## 2023-11-17 DIAGNOSIS — E55.9 VITAMIN D DEFICIENCY: ICD-10-CM

## 2023-11-17 DIAGNOSIS — M54.9 CHRONIC BACK PAIN GREATER THAN 3 MONTHS DURATION: ICD-10-CM

## 2023-11-17 PROCEDURE — 1101F PR PT FALLS ASSESS DOC 0-1 FALLS W/OUT INJ PAST YR: ICD-10-PCS | Mod: CPTII,,, | Performed by: INTERNAL MEDICINE

## 2023-11-17 PROCEDURE — 90694 FLU VACCINE - QUADRIVALENT - ADJUVANTED: ICD-10-PCS | Mod: ,,, | Performed by: INTERNAL MEDICINE

## 2023-11-17 PROCEDURE — 1159F PR MEDICATION LIST DOCUMENTED IN MEDICAL RECORD: ICD-10-PCS | Mod: CPTII,,, | Performed by: INTERNAL MEDICINE

## 2023-11-17 PROCEDURE — 1101F PT FALLS ASSESS-DOCD LE1/YR: CPT | Mod: CPTII,,, | Performed by: INTERNAL MEDICINE

## 2023-11-17 PROCEDURE — G0439 PPPS, SUBSEQ VISIT: HCPCS | Mod: ,,, | Performed by: INTERNAL MEDICINE

## 2023-11-17 PROCEDURE — 90694 VACC AIIV4 NO PRSRV 0.5ML IM: CPT | Mod: ,,, | Performed by: INTERNAL MEDICINE

## 2023-11-17 PROCEDURE — G0008 FLU VACCINE - QUADRIVALENT - ADJUVANTED: ICD-10-PCS | Mod: ,,, | Performed by: INTERNAL MEDICINE

## 2023-11-17 PROCEDURE — G0439 PR MEDICARE ANNUAL WELLNESS SUBSEQUENT VISIT: ICD-10-PCS | Mod: ,,, | Performed by: INTERNAL MEDICINE

## 2023-11-17 PROCEDURE — 1159F MED LIST DOCD IN RCRD: CPT | Mod: CPTII,,, | Performed by: INTERNAL MEDICINE

## 2023-11-17 PROCEDURE — 3288F FALL RISK ASSESSMENT DOCD: CPT | Mod: CPTII,,, | Performed by: INTERNAL MEDICINE

## 2023-11-17 PROCEDURE — 3075F SYST BP GE 130 - 139MM HG: CPT | Mod: CPTII,,, | Performed by: INTERNAL MEDICINE

## 2023-11-17 PROCEDURE — 3078F DIAST BP <80 MM HG: CPT | Mod: CPTII,,, | Performed by: INTERNAL MEDICINE

## 2023-11-17 PROCEDURE — 3075F PR MOST RECENT SYSTOLIC BLOOD PRESS GE 130-139MM HG: ICD-10-PCS | Mod: CPTII,,, | Performed by: INTERNAL MEDICINE

## 2023-11-17 PROCEDURE — 1160F PR REVIEW ALL MEDS BY PRESCRIBER/CLIN PHARMACIST DOCUMENTED: ICD-10-PCS | Mod: CPTII,,, | Performed by: INTERNAL MEDICINE

## 2023-11-17 PROCEDURE — 3288F PR FALLS RISK ASSESSMENT DOCUMENTED: ICD-10-PCS | Mod: CPTII,,, | Performed by: INTERNAL MEDICINE

## 2023-11-17 PROCEDURE — 1160F RVW MEDS BY RX/DR IN RCRD: CPT | Mod: CPTII,,, | Performed by: INTERNAL MEDICINE

## 2023-11-17 PROCEDURE — 3078F PR MOST RECENT DIASTOLIC BLOOD PRESSURE < 80 MM HG: ICD-10-PCS | Mod: CPTII,,, | Performed by: INTERNAL MEDICINE

## 2023-11-17 PROCEDURE — G0008 ADMIN INFLUENZA VIRUS VAC: HCPCS | Mod: ,,, | Performed by: INTERNAL MEDICINE

## 2023-11-17 RX ORDER — NETARSUDIL 0.2 MG/ML
1 SOLUTION/ DROPS OPHTHALMIC; TOPICAL NIGHTLY
COMMUNITY
Start: 2023-11-10

## 2023-11-17 RX ORDER — BRINZOLAMIDE 10 MG/ML
1 SUSPENSION/ DROPS OPHTHALMIC 3 TIMES DAILY
COMMUNITY
Start: 2023-10-30

## 2023-11-27 ENCOUNTER — DOCUMENTATION ONLY (OUTPATIENT)
Dept: INTERNAL MEDICINE | Facility: CLINIC | Age: 73
End: 2023-11-27
Payer: MEDICARE

## 2023-11-27 LAB — CRC RECOMMENDATION EXT: NORMAL

## 2023-11-29 ENCOUNTER — TELEPHONE (OUTPATIENT)
Dept: INTERNAL MEDICINE | Facility: CLINIC | Age: 73
End: 2023-11-29
Payer: MEDICARE

## 2023-11-29 DIAGNOSIS — R09.89 SINUS COMPLAINT: Primary | ICD-10-CM

## 2023-11-29 RX ORDER — BENZONATATE 100 MG/1
100 CAPSULE ORAL 3 TIMES DAILY PRN
Qty: 30 CAPSULE | Refills: 0 | Status: SHIPPED | OUTPATIENT
Start: 2023-11-29 | End: 2023-12-09

## 2023-11-29 RX ORDER — AZITHROMYCIN 500 MG/1
500 TABLET, FILM COATED ORAL DAILY
Qty: 5 TABLET | Refills: 0 | Status: SHIPPED | OUTPATIENT
Start: 2023-11-29

## 2023-11-29 RX ORDER — METHYLPREDNISOLONE 4 MG/1
TABLET ORAL
Qty: 21 EACH | Refills: 0 | Status: SHIPPED | OUTPATIENT
Start: 2023-11-29 | End: 2023-12-20

## 2023-11-29 NOTE — TELEPHONE ENCOUNTER
----- Message from Khushi Irving sent at 11/29/2023  8:55 AM CST -----  .Type:  Patient Returning Call    Who Called:pt   Who Left Message for Patient:Tej Sandra   Does the patient know what this is regarding?:medication for cough and congestion   Would the patient rather a call back or a response via [facility]? Call back   Best Call Back Number:[phone]  Additional Information: pt states that she needs medication for the cough and congestion that she has going on.

## 2023-12-06 ENCOUNTER — HOSPITAL ENCOUNTER (OUTPATIENT)
Dept: RADIOLOGY | Facility: HOSPITAL | Age: 73
Discharge: HOME | End: 2023-12-06
Attending: INTERNAL MEDICINE

## 2023-12-06 DIAGNOSIS — Z13.6 ENCOUNTER FOR SCREENING FOR CARDIOVASCULAR DISORDERS: ICD-10-CM

## 2023-12-06 PROCEDURE — 75571 CT HRT W/O DYE W/CA TEST: CPT | Mod: TC

## 2023-12-08 ENCOUNTER — DOCUMENTATION ONLY (OUTPATIENT)
Dept: ADMINISTRATIVE | Facility: HOSPITAL | Age: 73
End: 2023-12-08
Payer: MEDICARE

## 2023-12-08 ENCOUNTER — PATIENT OUTREACH (OUTPATIENT)
Dept: ADMINISTRATIVE | Facility: HOSPITAL | Age: 73
End: 2023-12-08
Payer: MEDICARE

## 2023-12-08 ENCOUNTER — PATIENT MESSAGE (OUTPATIENT)
Dept: ADMINISTRATIVE | Facility: HOSPITAL | Age: 73
End: 2023-12-08
Payer: MEDICARE

## 2023-12-08 NOTE — PROGRESS NOTES
"LPN Care Coordination Encounter Details:    Outreach Performed: NO Sent Portal Message and         Recent ED and/or IP Discharges:    11/17/2023          [x]  Reviewed recent ED & Inpatient Discharges to ensure appropriate           follow up appointments are scheduled         Additional Notes:             Provider Team Continuity:        [x]  Reviewed Primary Care Provider Visits, Annual Wellness Visit, and Future          Appointments to ensure appointments have been scheduled and/or           completed        Additional Notes:             MyChart Portal Status:         [x]  Reviewed MyChart Portal Status offered / enrolled if applicable        Additional Notes:     MyChart Outcomes: Pt is enrolled & active            Health Maintenance Screening(s) Due:      Additional [facility] Health Notes:                  Updates Requested / Reviewed:        Updated Care Coordination Note, Care Everywhere, , Care Team Updated, and Other    waiting on patient portal response where she had or if Dexa scan done.         Health Maintenance Topics Overdue:      VBHM Score: 1     Osteoporosis Screening                Health Maintenance Topic(s) Outreach Outcomes & Actions Taken:                                        Osteoporosis Screening - Outreach Outcomes & Actions Taken  : Patient Dexa Scan was ordered waiting on patient response through [employer] message if she had it done at [facility] will contact patient on 12/11/2023 for f/u      Chronic Disease Management:     Diabetes Measures      No results found for: "LABA1C", "HGBA1C"        [x]  Reviewed chart for active Diabetes diagnosis     []  Scheduled necessary follow up appointments if needed         Additional Notes:    No f/u scheduled         Hypertension Measures        BP Readings from Last 1 Encounters:   11/17/23 132/78           [x]  Reviewed chart for active Hypertension diagnosis     []  Reviewed & documented Home BP Cuff     []  Documented a " Remote BP if needed & applicable     []  Scheduled necessary follow up appointments with Primary Care if needed         Additional Notes:    Patient last reading on 11/17/2023 was 132/78         Social Determinants of Health         [x]  Reviewed, completed, and/or updated the following sections:                  Food Insecurity, Transportation Needs, Financial Resource Strain,                 Tobacco Use          Care Management, Digital Medicine, and/or Education Referrals    OPCM Risk Score: 1                 Additional Notes:      No referral made

## 2023-12-12 ENCOUNTER — PATIENT OUTREACH (OUTPATIENT)
Dept: ADMINISTRATIVE | Facility: HOSPITAL | Age: 73
End: 2023-12-12
Payer: MEDICARE

## 2024-01-22 ENCOUNTER — HOSPITAL ENCOUNTER (OUTPATIENT)
Dept: RADIOLOGY | Facility: HOSPITAL | Age: 74
Discharge: HOME | End: 2024-01-22
Attending: INTERNAL MEDICINE
Payer: MEDICARE

## 2024-01-22 DIAGNOSIS — Z78.0 POSTMENOPAUSAL: ICD-10-CM

## 2024-01-22 PROCEDURE — 77081 DXA BONE DENSITY APPENDICULR: CPT | Mod: 26,XU,, | Performed by: STUDENT IN AN ORGANIZED HEALTH CARE EDUCATION/TRAINING PROGRAM

## 2024-01-22 PROCEDURE — 77081 DXA BONE DENSITY APPENDICULR: CPT | Mod: XU,TC

## 2024-01-22 PROCEDURE — 77080 DXA BONE DENSITY AXIAL: CPT | Mod: TC

## 2024-01-22 PROCEDURE — 77080 DXA BONE DENSITY AXIAL: CPT | Mod: 26,XU,, | Performed by: STUDENT IN AN ORGANIZED HEALTH CARE EDUCATION/TRAINING PROGRAM

## 2024-01-24 DIAGNOSIS — R09.89 SINUS COMPLAINT: ICD-10-CM

## 2024-01-24 RX ORDER — AZELASTINE 1 MG/ML
1 SPRAY, METERED NASAL 2 TIMES DAILY
Qty: 30 ML | Refills: 11 | Status: SHIPPED | OUTPATIENT
Start: 2024-01-24

## 2024-02-04 DIAGNOSIS — I10 PRIMARY HYPERTENSION: ICD-10-CM

## 2024-02-05 RX ORDER — TRIAMTERENE/HYDROCHLOROTHIAZID 37.5-25 MG
1 TABLET ORAL DAILY
Qty: 90 TABLET | Refills: 3 | Status: SHIPPED | OUTPATIENT
Start: 2024-02-05

## 2024-03-18 ENCOUNTER — TELEPHONE (OUTPATIENT)
Dept: INTERNAL MEDICINE | Facility: CLINIC | Age: 74
End: 2024-03-18
Payer: MEDICARE

## 2024-03-18 DIAGNOSIS — Z00.00 WELLNESS EXAMINATION: Primary | ICD-10-CM

## 2024-03-18 DIAGNOSIS — R09.89 SINUS COMPLAINT: ICD-10-CM

## 2024-03-18 RX ORDER — AMOXICILLIN AND CLAVULANATE POTASSIUM 875; 125 MG/1; MG/1
1 TABLET, FILM COATED ORAL 2 TIMES DAILY
Qty: 14 TABLET | Refills: 0 | Status: SHIPPED | OUTPATIENT
Start: 2024-03-18 | End: 2024-03-25

## 2024-03-18 NOTE — TELEPHONE ENCOUNTER
----- Message from Akilah Canales sent at 3/18/2024  9:17 AM CDT -----  .Type:  Needs Medical Advice    Who Called: pt  Symptoms (please be specific):    How long has patient had these symptoms:    Pharmacy name and phone #:    Would the patient rather a call back or a response via [facility]?   Best Call Back Number: [phone]  Additional Information: has sinus infection and wanting meds    .Type:  RX Refill Request

## 2024-06-08 ENCOUNTER — PATIENT MESSAGE (OUTPATIENT)
Dept: ADMINISTRATIVE | Facility: OTHER | Age: 74
End: 2024-06-08
Payer: MEDICARE

## 2024-06-10 ENCOUNTER — PATIENT MESSAGE (OUTPATIENT)
Dept: ADMINISTRATIVE | Facility: OTHER | Age: 74
End: 2024-06-10
Payer: MEDICARE

## 2024-06-17 ENCOUNTER — TELEPHONE (OUTPATIENT)
Dept: INTERNAL MEDICINE | Facility: CLINIC | Age: 74
End: 2024-06-17
Payer: MEDICARE

## 2024-06-17 DIAGNOSIS — R39.15 URGENCY OF URINATION: Primary | ICD-10-CM

## 2024-06-17 RX ORDER — CIPROFLOXACIN 500 MG/1
500 TABLET ORAL 2 TIMES DAILY
Qty: 10 TABLET | Refills: 0 | Status: SHIPPED | OUTPATIENT
Start: 2024-06-17 | End: 2024-06-22

## 2024-06-17 NOTE — TELEPHONE ENCOUNTER
----- Message from Akilah Canales sent at 6/17/2024  9:02 AM CDT -----  .Who Called: Sandra Crowe    Caller is requesting assistance/information from provider's office.    Symptoms (please be specific): bladder infection   How long has patient had these symptoms:    List of preferred pharmacies on file (remove unneeded): [unfilled]  If different, enter pharmacy into here including location and phone number:       Preferred Method of Contact: Phone Call  Patient's Preferred Phone Number on File: [phone]   Best Call Back Number, if different:  Additional Information: pt asking for something for bladder infection

## 2024-07-12 ENCOUNTER — TELEPHONE (OUTPATIENT)
Dept: INTERNAL MEDICINE | Facility: CLINIC | Age: 74
End: 2024-07-12
Payer: MEDICARE

## 2024-07-12 DIAGNOSIS — R09.89 SINUS COMPLAINT: Primary | ICD-10-CM

## 2024-07-12 RX ORDER — AMOXICILLIN AND CLAVULANATE POTASSIUM 875; 125 MG/1; MG/1
1 TABLET, FILM COATED ORAL 2 TIMES DAILY
Qty: 14 TABLET | Refills: 0 | Status: SHIPPED | OUTPATIENT
Start: 2024-07-12 | End: 2024-07-19

## 2024-07-12 NOTE — TELEPHONE ENCOUNTER
----- Message from Alina Senegal sent at 7/12/2024  9:03 AM CDT -----  .Type:  Needs Medical Advice    Who Called:  pt    Symptoms (please be specific):  thick mucus, pain on left side of face     How long has patient had these symptoms:   2 days    Pharmacy name and phone #:   [facility] [address] [place] [address]   Phone: [phone]  Fax: [phone]        Would the patient rather a call back or a response via [facility]?      Best Call Back Number:  [phone]    Additional Information:  pt would like something for her symptoms thanks

## 2024-08-23 ENCOUNTER — TELEPHONE (OUTPATIENT)
Dept: INTERNAL MEDICINE | Facility: CLINIC | Age: 74
End: 2024-08-23
Payer: MEDICARE

## 2024-08-23 DIAGNOSIS — R39.15 URGENCY OF URINATION: Primary | ICD-10-CM

## 2024-08-23 RX ORDER — CIPROFLOXACIN 500 MG/1
500 TABLET ORAL 2 TIMES DAILY
Qty: 10 TABLET | Refills: 0 | Status: SHIPPED | OUTPATIENT
Start: 2024-08-23 | End: 2024-08-28

## 2024-08-23 NOTE — TELEPHONE ENCOUNTER
----- Message from Angelika Taveras sent at 8/23/2024  8:49 AM CDT -----  .Type:  Needs Medical Advice    Who Called: PT  Symptoms (please be specific): Bladder infection   How long has patient had these symptoms:  this week  [facility] [address]  Would the patient rather a call back or a response via [facility]?   Best Call Back Number: [phone]  Additional Information: Please call something in for Bladder infection

## 2024-09-16 ENCOUNTER — TELEPHONE (OUTPATIENT)
Dept: INTERNAL MEDICINE | Facility: CLINIC | Age: 74
End: 2024-09-16
Payer: MEDICARE

## 2024-09-16 ENCOUNTER — LAB VISIT (OUTPATIENT)
Dept: LAB | Facility: HOSPITAL | Age: 74
End: 2024-09-16
Attending: INTERNAL MEDICINE
Payer: MEDICARE

## 2024-09-16 DIAGNOSIS — R39.15 URGENCY OF URINATION: Primary | ICD-10-CM

## 2024-09-16 DIAGNOSIS — R39.15 URGENCY OF URINATION: ICD-10-CM

## 2024-09-16 DIAGNOSIS — R30.0 DYSURIA: ICD-10-CM

## 2024-09-16 LAB
AMORPH URATE CRY URNS QL MICRO: ABNORMAL /UL
BACTERIA #/AREA URNS AUTO: ABNORMAL /HPF
BILIRUB UR QL STRIP.AUTO: NEGATIVE
CLARITY UR: CLEAR
COLOR UR AUTO: ABNORMAL
EPI CELLS #/AREA URNS HPF: ABNORMAL /HPF
GLUCOSE UR QL STRIP: NORMAL
HGB UR QL STRIP: NEGATIVE
KETONES UR QL STRIP: NEGATIVE
LEUKOCYTE ESTERASE UR QL STRIP: 500
NITRITE UR QL STRIP: NEGATIVE
PH UR STRIP: 7 [PH]
PROT UR QL STRIP: NEGATIVE
RBC #/AREA URNS AUTO: ABNORMAL /HPF
SP GR UR STRIP.AUTO: 1.01 (ref 1–1.03)
SQUAMOUS #/AREA URNS LPF: ABNORMAL /HPF
UROBILINOGEN UR STRIP-ACNC: NORMAL
WBC #/AREA URNS AUTO: >100 /HPF

## 2024-09-16 PROCEDURE — 87077 CULTURE AEROBIC IDENTIFY: CPT

## 2024-09-16 PROCEDURE — 87086 URINE CULTURE/COLONY COUNT: CPT

## 2024-09-16 PROCEDURE — 81001 URINALYSIS AUTO W/SCOPE: CPT

## 2024-09-16 RX ORDER — SULFAMETHOXAZOLE AND TRIMETHOPRIM 800; 160 MG/1; MG/1
1 TABLET ORAL 2 TIMES DAILY
Qty: 8 TABLET | Refills: 0 | Status: SHIPPED | OUTPATIENT
Start: 2024-09-16 | End: 2024-09-20

## 2024-09-16 NOTE — TELEPHONE ENCOUNTER
----- Message from Gibran Echevarria sent at 9/16/2024  9:02 AM CDT -----  .Who Called: Sandra Crowe    Caller is requesting assistance/information from provider's office.    Symptoms (please be specific): UTI   How long has patient had these symptoms:    List of preferred pharmacies on file (remove unneeded):       Preferred Method of Contact: Phone Call  Patient's Preferred Phone Number on File: [phone]   Best Call Back Number, if different:  Additional Information: symptoms worsened over weekend

## 2024-09-18 DIAGNOSIS — R30.0 DYSURIA: ICD-10-CM

## 2024-09-18 DIAGNOSIS — R39.15 URGENCY OF URINATION: Primary | ICD-10-CM

## 2024-09-18 LAB — BACTERIA UR CULT: ABNORMAL

## 2024-09-18 RX ORDER — CEFUROXIME AXETIL 500 MG/1
500 TABLET ORAL 2 TIMES DAILY
Qty: 14 TABLET | Refills: 0 | Status: SHIPPED | OUTPATIENT
Start: 2024-09-18 | End: 2024-09-25

## 2024-11-11 ENCOUNTER — TELEPHONE (OUTPATIENT)
Dept: INTERNAL MEDICINE | Facility: CLINIC | Age: 74
End: 2024-11-11
Payer: MEDICARE

## 2024-11-14 ENCOUNTER — OFFICE VISIT (OUTPATIENT)
Dept: URGENT CARE | Facility: CLINIC | Age: 74
End: 2024-11-14
Payer: MEDICARE

## 2024-11-14 ENCOUNTER — LAB VISIT (OUTPATIENT)
Dept: LAB | Facility: HOSPITAL | Age: 74
End: 2024-11-14
Attending: INTERNAL MEDICINE
Payer: MEDICARE

## 2024-11-14 VITALS
SYSTOLIC BLOOD PRESSURE: 154 MMHG | WEIGHT: 142 LBS | OXYGEN SATURATION: 98 % | DIASTOLIC BLOOD PRESSURE: 78 MMHG | HEART RATE: 58 BPM | HEIGHT: 67 IN | BODY MASS INDEX: 22.29 KG/M2 | TEMPERATURE: 98 F | RESPIRATION RATE: 18 BRPM

## 2024-11-14 DIAGNOSIS — M54.9 CHRONIC BACK PAIN GREATER THAN 3 MONTHS DURATION: ICD-10-CM

## 2024-11-14 DIAGNOSIS — I10 PRIMARY HYPERTENSION: ICD-10-CM

## 2024-11-14 DIAGNOSIS — Z00.00 WELLNESS EXAMINATION: ICD-10-CM

## 2024-11-14 DIAGNOSIS — M51.369 LUMBAR DEGENERATIVE DISC DISEASE: ICD-10-CM

## 2024-11-14 DIAGNOSIS — E55.9 VITAMIN D DEFICIENCY: ICD-10-CM

## 2024-11-14 DIAGNOSIS — G89.29 CHRONIC BACK PAIN GREATER THAN 3 MONTHS DURATION: ICD-10-CM

## 2024-11-14 DIAGNOSIS — I10 HYPERTENSION, UNSPECIFIED TYPE: Primary | ICD-10-CM

## 2024-11-14 LAB
25(OH)D3+25(OH)D2 SERPL-MCNC: 38 NG/ML (ref 30–80)
ALBUMIN SERPL-MCNC: 4.2 G/DL (ref 3.4–4.8)
ALBUMIN/GLOB SERPL: 1.6 RATIO (ref 1.1–2)
ALP SERPL-CCNC: 81 UNIT/L (ref 40–150)
ALT SERPL-CCNC: 19 UNIT/L (ref 0–55)
ANION GAP SERPL CALC-SCNC: 5 MEQ/L
AST SERPL-CCNC: 26 UNIT/L (ref 5–34)
BACTERIA #/AREA URNS AUTO: ABNORMAL /HPF
BASOPHILS # BLD AUTO: 0.08 X10(3)/MCL
BASOPHILS NFR BLD AUTO: 1.3 %
BILIRUB SERPL-MCNC: 0.6 MG/DL
BILIRUB UR QL STRIP.AUTO: NEGATIVE
BUN SERPL-MCNC: 24 MG/DL (ref 9.8–20.1)
CALCIUM SERPL-MCNC: 9.6 MG/DL (ref 8.4–10.2)
CHLORIDE SERPL-SCNC: 107 MMOL/L (ref 98–107)
CHOLEST SERPL-MCNC: 199 MG/DL
CHOLEST/HDLC SERPL: 4 {RATIO} (ref 0–5)
CLARITY UR: CLEAR
CO2 SERPL-SCNC: 28 MMOL/L (ref 23–31)
COLOR UR AUTO: ABNORMAL
CREAT SERPL-MCNC: 0.94 MG/DL (ref 0.55–1.02)
CREAT/UREA NIT SERPL: 26
EOSINOPHIL # BLD AUTO: 0.17 X10(3)/MCL (ref 0–0.9)
EOSINOPHIL NFR BLD AUTO: 2.8 %
ERYTHROCYTE [DISTWIDTH] IN BLOOD BY AUTOMATED COUNT: 13.4 % (ref 11.5–17)
GFR SERPLBLD CREATININE-BSD FMLA CKD-EPI: >60 ML/MIN/1.73/M2
GLOBULIN SER-MCNC: 2.7 GM/DL (ref 2.4–3.5)
GLUCOSE SERPL-MCNC: 93 MG/DL (ref 82–115)
GLUCOSE UR QL STRIP: NORMAL
HCT VFR BLD AUTO: 44.6 % (ref 37–47)
HDLC SERPL-MCNC: 55 MG/DL (ref 35–60)
HGB BLD-MCNC: 14.5 G/DL (ref 12–16)
HGB UR QL STRIP: NEGATIVE
IMM GRANULOCYTES # BLD AUTO: 0.01 X10(3)/MCL (ref 0–0.04)
IMM GRANULOCYTES NFR BLD AUTO: 0.2 %
KETONES UR QL STRIP: NEGATIVE
LDLC SERPL CALC-MCNC: 131 MG/DL (ref 50–140)
LEUKOCYTE ESTERASE UR QL STRIP: 75
LYMPHOCYTES # BLD AUTO: 2.49 X10(3)/MCL (ref 0.6–4.6)
LYMPHOCYTES NFR BLD AUTO: 41.3 %
MCH RBC QN AUTO: 30.2 PG (ref 27–31)
MCHC RBC AUTO-ENTMCNC: 32.5 G/DL (ref 33–36)
MCV RBC AUTO: 92.9 FL (ref 80–94)
MONOCYTES # BLD AUTO: 0.52 X10(3)/MCL (ref 0.1–1.3)
MONOCYTES NFR BLD AUTO: 8.6 %
MUCOUS THREADS URNS QL MICRO: ABNORMAL /LPF
NEUTROPHILS # BLD AUTO: 2.76 X10(3)/MCL (ref 2.1–9.2)
NEUTROPHILS NFR BLD AUTO: 45.8 %
NITRITE UR QL STRIP: NEGATIVE
NRBC BLD AUTO-RTO: 0 %
PH UR STRIP: 7 [PH]
PLATELET # BLD AUTO: 201 X10(3)/MCL (ref 130–400)
PMV BLD AUTO: 10.4 FL (ref 7.4–10.4)
POTASSIUM SERPL-SCNC: 3.9 MMOL/L (ref 3.5–5.1)
PROT SERPL-MCNC: 6.9 GM/DL (ref 5.8–7.6)
PROT UR QL STRIP: NEGATIVE
RBC # BLD AUTO: 4.8 X10(6)/MCL (ref 4.2–5.4)
RBC #/AREA URNS AUTO: ABNORMAL /HPF
SODIUM SERPL-SCNC: 140 MMOL/L (ref 136–145)
SP GR UR STRIP.AUTO: 1.02 (ref 1–1.03)
SQUAMOUS #/AREA URNS LPF: ABNORMAL /HPF
TRIGL SERPL-MCNC: 66 MG/DL (ref 37–140)
TSH SERPL-ACNC: 1.42 UIU/ML (ref 0.35–4.94)
UROBILINOGEN UR STRIP-ACNC: NORMAL
VLDLC SERPL CALC-MCNC: 13 MG/DL
WBC # BLD AUTO: 6.03 X10(3)/MCL (ref 4.5–11.5)
WBC #/AREA URNS AUTO: ABNORMAL /HPF

## 2024-11-14 PROCEDURE — 81001 URINALYSIS AUTO W/SCOPE: CPT

## 2024-11-14 PROCEDURE — 80053 COMPREHEN METABOLIC PANEL: CPT

## 2024-11-14 PROCEDURE — 85025 COMPLETE CBC W/AUTO DIFF WBC: CPT

## 2024-11-14 PROCEDURE — 84443 ASSAY THYROID STIM HORMONE: CPT

## 2024-11-14 PROCEDURE — 82306 VITAMIN D 25 HYDROXY: CPT

## 2024-11-14 PROCEDURE — 80061 LIPID PANEL: CPT

## 2024-11-14 PROCEDURE — 36415 COLL VENOUS BLD VENIPUNCTURE: CPT

## 2024-11-14 NOTE — PROGRESS NOTES
"Subjective:      Patient ID: Sandra Crowe is a 74 y.o. female.    Vitals:  height is 5' 7" (1.702 m) and weight is 64.4 kg (142 lb). Her temperature is 97.5 °F (36.4 °C). Her blood pressure is 154/78 (abnormal) and her pulse is 58 (abnormal). Her respiration is 18 and oxygen saturation is 98%.     Chief Complaint: Hypertension     Patient is a 74 y.o. female who presents to urgent care with complaints of Hypertension .  Patient has a history of high blood pressure and takes high blood pressure medications however she was started on eyedrops that started making your blood pressure drop.  The ophthalmologist told her to stop the drops which was yesterday.  States now today her blood pressure has been running high.B/P 172/114 at home, Complaining of a mild headache.  Denies any dizziness lightheadedness changes in vision weakness or numbness in the arms or legs chest pain or shortness on breath.  Patient is taking her blood pressure medications normally but she did add a 2nd dose around 4:00 p.m. when she saw that her blood pressure was elevated.      Constitution: Negative.   HENT: Negative.     Cardiovascular: Negative.    Eyes: Negative.    Respiratory: Negative.     Gastrointestinal: Negative.    Genitourinary: Negative.    Musculoskeletal: Negative.    Skin: Negative.    Allergic/Immunologic: Negative.    Neurological:  Positive for headaches.   Hematologic/Lymphatic: Negative.       Objective:     Physical Exam   Constitutional: She is oriented to person, place, and time. She appears well-developed. She is cooperative.  Non-toxic appearance. She does not appear ill. No distress.   HENT:   Head: Normocephalic and atraumatic.   Ears:   Right Ear: Hearing and external ear normal.   Left Ear: Hearing and external ear normal.   Mouth/Throat: Oropharynx is clear and moist and mucous membranes are normal.   Eyes: Conjunctivae and lids are normal.   Neck: Trachea normal and phonation normal. Neck supple. No edema " present. No erythema present. No neck rigidity present.   Cardiovascular: Normal rate and normal heart sounds.   No murmur heard.  Pulmonary/Chest: Effort normal and breath sounds normal. No stridor. No respiratory distress. She has no decreased breath sounds. She has no wheezes. She has no rhonchi. She has no rales.   Abdominal: Normal appearance.   Neurological: no focal deficit. She is alert and oriented to person, place, and time. She displays no weakness and normal reflexes. No cranial nerve deficit or sensory deficit. She exhibits normal muscle tone. Coordination and gait normal.   Skin: Skin is warm, dry, intact, not diaphoretic and no rash.   Psychiatric: Her speech is normal and behavior is normal. Mood, judgment and thought content normal.   Nursing note and vitals reviewed.  Cranial nerves 2 through 12 grossly intact.  Assessment:     1. Hypertension, unspecified type        Plan:   Recheck of blood pressure was 154/78  As discussed follow-up with your ophthalmologist and your primary care physician to discuss further  Avoid caffeine and other stimulants that we discussed for the time being until your blood pressure is well-controlled  Should you develop any chest pain shortness a breath dizziness lightheadedness worsening of your headache changes in your vision weakness or numbness in the arms or leg slurred speech or confusion go to the emergency department immediately.    Hypertension, unspecified type

## 2024-11-18 ENCOUNTER — OFFICE VISIT (OUTPATIENT)
Dept: INTERNAL MEDICINE | Facility: CLINIC | Age: 74
End: 2024-11-18
Payer: MEDICARE

## 2024-11-18 VITALS
BODY MASS INDEX: 22.91 KG/M2 | HEIGHT: 67 IN | DIASTOLIC BLOOD PRESSURE: 78 MMHG | RESPIRATION RATE: 16 BRPM | OXYGEN SATURATION: 97 % | HEART RATE: 58 BPM | TEMPERATURE: 98 F | WEIGHT: 146 LBS | SYSTOLIC BLOOD PRESSURE: 130 MMHG

## 2024-11-18 DIAGNOSIS — Z12.31 OTHER SCREENING MAMMOGRAM: ICD-10-CM

## 2024-11-18 DIAGNOSIS — J32.9 CHRONIC RHINOSINUSITIS: ICD-10-CM

## 2024-11-18 DIAGNOSIS — R09.89 SINUS COMPLAINT: ICD-10-CM

## 2024-11-18 DIAGNOSIS — I10 PRIMARY HYPERTENSION: ICD-10-CM

## 2024-11-18 DIAGNOSIS — Z00.00 WELLNESS EXAMINATION: Primary | ICD-10-CM

## 2024-11-18 DIAGNOSIS — E55.9 VITAMIN D DEFICIENCY: ICD-10-CM

## 2024-11-18 DIAGNOSIS — Z12.31 VISIT FOR SCREENING MAMMOGRAM: ICD-10-CM

## 2024-11-18 DIAGNOSIS — Z23 NEED FOR VACCINATION: ICD-10-CM

## 2024-11-18 DIAGNOSIS — M51.369 DEGENERATION OF INTERVERTEBRAL DISC OF LUMBAR REGION, UNSPECIFIED WHETHER PAIN PRESENT: ICD-10-CM

## 2024-11-18 PROCEDURE — 1160F RVW MEDS BY RX/DR IN RCRD: CPT | Mod: CPTII,,, | Performed by: INTERNAL MEDICINE

## 2024-11-18 PROCEDURE — 1101F PT FALLS ASSESS-DOCD LE1/YR: CPT | Mod: CPTII,,, | Performed by: INTERNAL MEDICINE

## 2024-11-18 PROCEDURE — G0009 ADMIN PNEUMOCOCCAL VACCINE: HCPCS | Mod: ,,, | Performed by: INTERNAL MEDICINE

## 2024-11-18 PROCEDURE — 1159F MED LIST DOCD IN RCRD: CPT | Mod: CPTII,,, | Performed by: INTERNAL MEDICINE

## 2024-11-18 PROCEDURE — 3075F SYST BP GE 130 - 139MM HG: CPT | Mod: CPTII,,, | Performed by: INTERNAL MEDICINE

## 2024-11-18 PROCEDURE — 1124F ACP DISCUSS-NO DSCNMKR DOCD: CPT | Mod: CPTII,,, | Performed by: INTERNAL MEDICINE

## 2024-11-18 PROCEDURE — G0008 ADMIN INFLUENZA VIRUS VAC: HCPCS | Mod: ,,, | Performed by: INTERNAL MEDICINE

## 2024-11-18 PROCEDURE — 3078F DIAST BP <80 MM HG: CPT | Mod: CPTII,,, | Performed by: INTERNAL MEDICINE

## 2024-11-18 PROCEDURE — 3288F FALL RISK ASSESSMENT DOCD: CPT | Mod: CPTII,,, | Performed by: INTERNAL MEDICINE

## 2024-11-18 PROCEDURE — 90653 IIV ADJUVANT VACCINE IM: CPT | Mod: ,,, | Performed by: INTERNAL MEDICINE

## 2024-11-18 PROCEDURE — G0439 PPPS, SUBSEQ VISIT: HCPCS | Mod: ,,, | Performed by: INTERNAL MEDICINE

## 2024-11-18 PROCEDURE — 90677 PCV20 VACCINE IM: CPT | Mod: ,,, | Performed by: INTERNAL MEDICINE

## 2024-11-18 PROCEDURE — 1125F AMNT PAIN NOTED PAIN PRSNT: CPT | Mod: CPTII,,, | Performed by: INTERNAL MEDICINE

## 2024-11-18 RX ORDER — LATANOPROST 50 UG/ML
1 SOLUTION/ DROPS OPHTHALMIC NIGHTLY
COMMUNITY
Start: 2024-10-08

## 2024-11-18 RX ORDER — AZELASTINE 1 MG/ML
1 SPRAY, METERED NASAL 2 TIMES DAILY
Qty: 30 ML | Refills: 11 | Status: SHIPPED | OUTPATIENT
Start: 2024-11-18 | End: 2025-11-18

## 2024-11-18 NOTE — PROGRESS NOTES
"Subjective:       Patient ID: Sandra Crowe is a 74 y.o. female.      Patient Care Team:  Karan Wyman II, MD as PCP - General (Internal Medicine)  Elisabeth Foley LPN as Licensed Practical Nurse    Chief Complaint: Medicare AWV Follow Up, Hypertension, and Vitamin D Deficiency    74 year old female seen today for follow up of HTN among other conditions.       Review of Systems   Constitutional:  Negative for fever.   HENT:  Negative for nosebleeds.    Eyes:  Negative for visual disturbance.   Respiratory:  Negative for shortness of breath.    Cardiovascular:  Negative for chest pain.   Gastrointestinal:  Negative for abdominal pain.   Genitourinary:  Negative for dysuria.   Musculoskeletal:  Negative for gait problem.   Neurological:  Negative for headaches.           Patient Reported Health Risk Assessment         Objective:      Physical Exam  HENT:      Head: Normocephalic.      Mouth/Throat:      Pharynx: Oropharynx is clear.   Eyes:      Extraocular Movements: Extraocular movements intact.   Cardiovascular:      Rate and Rhythm: Normal rate and regular rhythm.   Pulmonary:      Breath sounds: Normal breath sounds.   Abdominal:      Palpations: Abdomen is soft.   Musculoskeletal:         General: No swelling.   Skin:     General: Skin is warm.   Neurological:      General: No focal deficit present.      Mental Status: She is alert and oriented to person, place, and time.   Psychiatric:         Mood and Affect: Mood normal.         Vitals:    11/18/24 0945   BP: 130/78   Pulse: (!) 58   Resp: 16   Temp: 97.9 °F (36.6 °C)   SpO2: 97%   Weight: 66.2 kg (146 lb)   Height: 5' 7" (1.702 m)                   No data to display                  11/18/2024     9:45 AM 11/17/2023     8:45 AM 9/5/2023    11:00 AM 11/9/2022     9:00 AM 7/14/2022     9:45 AM   Fall Risk Assessment - Outpatient   Mobility Status Ambulatory Ambulatory Ambulatory Ambulatory Ambulatory   Number of falls 0 0 0 0 0   Identified as fall risk " False False False False False                  Assessment:       Problem List Items Addressed This Visit       Lumbar degenerative disc disease    Relevant Orders    CBC Auto Differential    Comprehensive Metabolic Panel    Lipid Panel    Urinalysis, Reflex to Urine Culture    TSH    Vitamin D    RESOLVED: Wellness examination - Primary    Relevant Orders    CBC Auto Differential    Comprehensive Metabolic Panel    Lipid Panel    Urinalysis, Reflex to Urine Culture    TSH    Vitamin D    Vitamin D deficiency    Relevant Orders    CBC Auto Differential    Comprehensive Metabolic Panel    Lipid Panel    Urinalysis, Reflex to Urine Culture    TSH    Vitamin D    Primary hypertension    Relevant Orders    CBC Auto Differential    Comprehensive Metabolic Panel    Lipid Panel    Urinalysis, Reflex to Urine Culture    TSH    Vitamin D     Other Visit Diagnoses       Need for vaccination        Relevant Orders    CBC Auto Differential    Comprehensive Metabolic Panel    Lipid Panel    Urinalysis, Reflex to Urine Culture    TSH    Vitamin D    Sinus complaint        Relevant Medications    azelastine (ASTELIN) 137 mcg (0.1 %) nasal spray    Other Relevant Orders    CBC Auto Differential    Comprehensive Metabolic Panel    Lipid Panel    Urinalysis, Reflex to Urine Culture    TSH    Vitamin D            Medication List with Changes/Refills   Current Medications    ALBUTEROL (PROVENTIL HFA) 90 MCG/ACTUATION INHALER    Inhale 2 puffs into the lungs every 6 (six) hours as needed for Wheezing. Rescue    ASPIRIN (ECOTRIN) 81 MG EC TABLET    Take 81 mg by mouth once daily.    BRINZOLAMIDE (AZOPT) 1 % OPHTHALMIC SUSPENSION    Place 1 drop into both eyes 3 (three) times daily.    CEQUA 0.09 % DPET    Place 1 drop into both eyes 2 (two) times daily.    LATANOPROST 0.005 % OPHTHALMIC SOLUTION    Place 1 drop into both eyes every evening.    TRIAMTERENE-HYDROCHLOROTHIAZIDE 37.5-25 MG (MAXZIDE-25) 37.5-25 MG PER TABLET    Take 1 tablet  by mouth once daily   Changed and/or Refilled Medications    Modified Medication Previous Medication    AZELASTINE (ASTELIN) 137 MCG (0.1 %) NASAL SPRAY azelastine (ASTELIN) 137 mcg (0.1 %) nasal spray       1 spray (137 mcg total) by Nasal route 2 (two) times daily.    Use 1 spray(s) in each nostril twice daily   Discontinued Medications    AZITHROMYCIN (ZITHROMAX) 500 MG TABLET    Take 1 tablet (500 mg total) by mouth once daily.    LUMIGAN 0.01 % DROP    Place 1 drop into both eyes nightly.    RHOPRESSA 0.02 % OPHTHALMIC SOLUTION    Place 1 drop into both eyes every evening.        Plan:       1. Hypertension: Continue current medication. Stable    2. History of palpitations: She had extensive cardiac workup in 2012 with [facility]. No chest pain, shortness of breath, or palpitations recently. Ca score 0 in 2023    3. Ptosis: She was evaluated by Dr. Foreign Arevalo in 2021, surgery was not recommended. Followed by Dr. Segovia at [facility]    4. Pulmonary nodules: Seen incidentally on calcium score in 2018. These are not concerning, and she has never smoked. We do not need any more imaging    5. Skin lesion: Referred to dermatology last time for skin lesion on the left leg    6. Chronic rhinosinusitis: Continue azelastine (glaucoma so don't use Flonase); was on allergy shots 20-30 years ago; refer to allergist    7. Wellness: C-scope in 11/2023, no repeat. MMG 9/2023, refer, Pneumovax 2017. Bone density showed osteopenia in 1/2024, Prevnar today and flu shot today      She worked at [id number], and she retired in 2019      Medicare Annual Wellness and Personalized Prevention Plan:   Fall Risk + Home Safety + Hearing Impairment + Depression Screen + Cognitive Impairment Screen + Health Risk Assessment all reviewed    Health Maintenance Topics with due status: Not Due       Topic Last Completion Date    DEXA Scan 01/22/2024    Lipid Panel 11/14/2024      The patient's Health Maintenance was reviewed and the following  appears to be due at this time:   Health Maintenance Due   Topic Date Due    Hepatitis C Screening  Never done    TETANUS VACCINE  Never done    Pneumococcal Vaccines (Age 65+) (2 of 2 - PCV) 11/09/2017    RSV Vaccine (Age 60+ and Pregnant patients) (1 - 1-dose 75+ series) Never done    Influenza Vaccine (1) 09/01/2024    COVID-19 Vaccine (6 - 2024-25 season) 09/01/2024       Advance Care Planning   I attest to discussing Advance Care Planning with patient and/or family member.  Education was provided including the importance of the Health Care Power of , Advance Directives, and/or [place] documentation.  The patient expressed understanding to the importance of this information and discussion.  Length of ACP conversation in minutes: 1    Advance Care Planning     Date: 11/18/2024  Patient did not wish or was not able to name a surrogate decision maker or provide an Advance Care Plan.           Opioid Screening: Patient medication list reviewed, patient is not taking prescription opioids. Patient is not using additional opioids than prescribed. Patient is at low risk of substance abuse based on this opioid use history.     No follow-ups on file. In addition to their scheduled follow up, the patient has also been instructed to follow up on as needed basis.

## 2024-11-27 ENCOUNTER — TELEPHONE (OUTPATIENT)
Dept: INTERNAL MEDICINE | Facility: CLINIC | Age: 74
End: 2024-11-27
Payer: MEDICARE

## 2024-11-27 NOTE — TELEPHONE ENCOUNTER
----- Message from [facility] [employer] sent at 11/27/2024  9:54 AM CST -----  .Type:  Needs Medical Advice    Who Called: PT  Symptoms (please be specific): UTI   How long has patient had these symptoms:  Yesterday  Ph[facility] [address]  Would the patient rather a call back or a response via [facility]?   Best Call Back Number: [phone]  Additional Information: Please call something in for UTI

## 2024-11-27 NOTE — TELEPHONE ENCOUNTER
Urgency, Burning starting yesterday, pt advised that I will forward her message to On-call and UA will be needed, pt stated she did not have time for that and would rather wait for Dr Wyman to return, please advise

## 2024-11-29 ENCOUNTER — OFFICE VISIT (OUTPATIENT)
Dept: URGENT CARE | Facility: CLINIC | Age: 74
End: 2024-11-29
Payer: MEDICARE

## 2024-11-29 VITALS
OXYGEN SATURATION: 96 % | DIASTOLIC BLOOD PRESSURE: 74 MMHG | HEART RATE: 61 BPM | SYSTOLIC BLOOD PRESSURE: 122 MMHG | WEIGHT: 146 LBS | TEMPERATURE: 98 F | HEIGHT: 66 IN | BODY MASS INDEX: 23.46 KG/M2

## 2024-11-29 DIAGNOSIS — R35.0 URINARY FREQUENCY: ICD-10-CM

## 2024-11-29 DIAGNOSIS — R31.9 URINARY TRACT INFECTION WITH HEMATURIA, SITE UNSPECIFIED: Primary | ICD-10-CM

## 2024-11-29 DIAGNOSIS — N39.0 URINARY TRACT INFECTION WITH HEMATURIA, SITE UNSPECIFIED: Primary | ICD-10-CM

## 2024-11-29 LAB
BILIRUB UR QL STRIP: POSITIVE
GLUCOSE UR QL STRIP: NEGATIVE
KETONES UR QL STRIP: NEGATIVE
LEUKOCYTE ESTERASE UR QL STRIP: POSITIVE
PH, POC UA: 7
POC BLOOD, URINE: POSITIVE
POC NITRATES, URINE: POSITIVE
PROT UR QL STRIP: NEGATIVE
SP GR UR STRIP: 1.01 (ref 1–1.03)
UROBILINOGEN UR STRIP-ACNC: POSITIVE (ref 0.1–1.1)

## 2024-11-29 RX ORDER — PHENAZOPYRIDINE HYDROCHLORIDE 200 MG/1
200 TABLET, FILM COATED ORAL 3 TIMES DAILY PRN
Qty: 9 TABLET | Refills: 0 | Status: SHIPPED | OUTPATIENT
Start: 2024-11-29 | End: 2024-12-02

## 2024-11-29 RX ORDER — CIPROFLOXACIN 250 MG/1
250 TABLET, FILM COATED ORAL EVERY 12 HOURS
Qty: 10 TABLET | Refills: 0 | Status: SHIPPED | OUTPATIENT
Start: 2024-11-29 | End: 2024-12-04

## 2024-11-29 RX ORDER — CEFTRIAXONE 1 G/1
1 INJECTION, POWDER, FOR SOLUTION INTRAMUSCULAR; INTRAVENOUS
Status: COMPLETED | OUTPATIENT
Start: 2024-11-29 | End: 2024-11-29

## 2024-11-29 RX ADMIN — CEFTRIAXONE 1 G: 1 INJECTION, POWDER, FOR SOLUTION INTRAMUSCULAR; INTRAVENOUS at 12:11

## 2024-11-29 NOTE — PROGRESS NOTES
"Subjective:      Patient ID: Sandra Crowe is a 74 y.o. female.    Vitals:  height is 5' 6" (1.676 m) and weight is 66.2 kg (146 lb). Her oral temperature is 97.5 °F (36.4 °C). Her blood pressure is 122/74 and her pulse is 61. Her oxygen saturation is 96%.     Chief Complaint: Dysuria     Patient is a 74 y.o. female who presents to urgent care with complaints of dysuria and urinary frequency  x2 days. Alleviating factors include AZO  with mild amount of relief. Patient denies lower back pain, fever, or abdominal pain.  Patient states she has a reoccurring history of UTIs.      Constitution: Negative. Negative for fever.   HENT: Negative.     Neck: neck negative.   Cardiovascular: Negative.    Eyes: Negative.    Respiratory: Negative.     Gastrointestinal: Negative.  Negative for abdominal pain, nausea and vomiting.   Endocrine: negative.   Genitourinary:  Positive for dysuria, frequency and urgency.   Musculoskeletal: Negative.  Negative for back pain.   Skin: Negative.    Allergic/Immunologic: Negative.    Neurological:  Negative for dizziness.   Hematologic/Lymphatic: Negative.    Psychiatric/Behavioral: Negative.        Objective:     Physical Exam   Constitutional: She is oriented to person, place, and time. She appears well-developed. She is cooperative.   HENT:   Head: Normocephalic and atraumatic.   Ears:   Right Ear: Hearing, tympanic membrane, external ear and ear canal normal.   Left Ear: Hearing, tympanic membrane, external ear and ear canal normal.   Nose: Nose normal. No mucosal edema or nasal deformity. No epistaxis. Right sinus exhibits no maxillary sinus tenderness and no frontal sinus tenderness. Left sinus exhibits no maxillary sinus tenderness and no frontal sinus tenderness.   Mouth/Throat: Uvula is midline, oropharynx is clear and moist and mucous membranes are normal. No trismus in the jaw. Normal dentition. No uvula swelling.   Eyes: Conjunctivae and lids are normal.   Neck: Trachea normal " "and phonation normal. Neck supple.   Cardiovascular: Normal rate, regular rhythm, normal heart sounds and normal pulses.   Pulmonary/Chest: Effort normal and breath sounds normal.   Abdominal: Normal appearance and bowel sounds are normal. Soft.   Musculoskeletal: Normal range of motion.         General: Normal range of motion.   Neurological: She is alert and oriented to person, place, and time. She exhibits normal muscle tone.   Skin: Skin is warm, dry and intact.   Psychiatric: Her speech is normal and behavior is normal. Judgment and thought content normal.   Nursing note and vitals reviewed.         Previous History      Review of patient's allergies indicates:   Allergen Reactions    Adhesive Other (See Comments)     "Tears my skin"    Brimonidine tartrate (bulk) Other (See Comments)    Codeine      Other reaction(s): Nauseated    Ivp dye [iodinated contrast media] Hives    Nsaids (non-steroidal anti-inflammatory drug)      Other reaction(s): Swelling       Past Medical History:   Diagnosis Date    Personal history of colonic polyps     Unspecified glaucoma     Vitamin D deficiency      Current Outpatient Medications   Medication Instructions    albuterol (PROVENTIL HFA) 90 mcg/actuation inhaler 2 puffs, Inhalation, Every 6 hours PRN, Rescue    aspirin (ECOTRIN) 81 mg, Daily    azelastine (ASTELIN) 137 mcg, Nasal, 2 times daily    brinzolamide (AZOPT) 1 % ophthalmic suspension 1 drop, 3 times daily    CEQUA 0.09 % Dpet 1 drop, 2 times daily    latanoprost 0.005 % ophthalmic solution 1 drop, Nightly    triamterene-hydrochlorothiazide 37.5-25 mg (MAXZIDE-25) 37.5-25 mg per tablet 1 tablet, Oral, Daily     Past Surgical History:   Procedure Laterality Date    CATARACT EXTRACTION      COLONOSCOPY  11/27/2023    COLONOSCOPY W/ POLYPECTOMY  11/11/2016    DESTRUCTION, CILIARY BODY, USING LASER Right 6/11/2024    Procedure: IRIDEX - OD;  Surgeon: Elodia Elizondo MD;  Location: [facility] OR;  Service: " "Ophthalmology;  Laterality: Right;    DESTRUCTION, CILIARY BODY, USING LASER Left 7/9/2024    Procedure: IRIDEX 4/15 - OS;  Surgeon: Elodia Elizondo MD;  Location[facility];  Service: Ophthalmology;  Laterality: Left;    EYE SURGERY  2015    FRACTURE SURGERY  1956    HYSTERECTOMY  2001    PARTIAL HYSTERECTOMY  08/04/2001    TRANSFORAMINAL EPIDURAL INJECTION OF STEROID Left 02/16/2022    TRANSFORAMINAL EPIDURAL INJECTION OF STEROID Left 01/19/2022     Family History   Problem Relation Name Age of Onset    Glaucoma Mother      Coronary artery disease Father         Social History     Tobacco Use    Smoking status: Never    Smokeless tobacco: Never   Substance Use Topics    Alcohol use: Never    Drug use: Never        Physical Exam      Vital Signs Reviewed   /74   Pulse 61   Temp 97.5 °F (36.4 °C) (Oral)   Ht 5' 6" (1.676 m)   Wt 66.2 kg (146 lb)   SpO2 96%   BMI 23.57 kg/m²        Procedures    Procedures     Labs     Results for orders placed or performed in visit on 11/29/24   POCT Urinalysis, Dipstick, Automated, W/O Scope    Collection Time: 11/29/24 12:02 PM   Result Value Ref Range    POC Blood, Urine Positive (A) Negative    POC Bilirubin, Urine Positive (A) Negative    POC Urobilinogen, Urine positive 0.1 - 1.1    POC Ketones, Urine Negative Negative    POC Protein, Urine Negative Negative    POC Nitrates, Urine Positive (A) Negative    POC Glucose, Urine Negative Negative    pH, UA 7.0     POC Specific Gravity, Urine 1.010 1.003 - 1.029    POC Leukocytes, Urine Positive (A) Negative     Assessment:     1. Urinary frequency    2. Urinary tract infection with hematuria, site unspecified        Plan:     A urinary tract infection (UTI) is caused by bacteria that get inside your urinary tract. Your urinary tract includes your kidneys, ureters, bladder, and urethra. A UTI is more common in your lower urinary tract, which includes your bladder and urethra.      DISCHARGE " INSTRUCTIONS:  Seek care immediately if:    You are urinating very little or not at all.  You have a high fever with shaking chills.  You have side or back pain that gets worse.  Call your doctor if:  You have a fever.  You do not feel better after 2 days of taking antibiotics.  You have new symptoms, such as blood or pus in your urine.  You are vomiting.  You have questions or concerns about your condition or care.    Medicines:  Antibiotics treat a bacterial infection. If you have UTIs often (called recurrent UTIs), you may be given antibiotics to take regularly. You will be given directions for when and how to use antibiotics. The goal is to prevent UTIs but not cause antibiotic resistance by using antibiotics too often.  Medicines may be given to decrease pain and burning when you urinate. They will also help decrease the feeling that you need to urinate often. These medicines may make your urine orange or red.  Take your medicine as directed. Contact your healthcare provider if you think your medicine is not helping or if you have side effects. Tell your provider if you are allergic to any medicine. Keep a list of the medicines, vitamins, and herbs you take. Include the amounts, and when and why you take them. Bring the list or the pill bottles to follow-up visits. Carry your medicine list with you in case of an emergency.  Follow up with your doctor as directed:  Write down your questions so you remember to ask them during your visits.    Prevent another UTI:  Empty your bladder often. Urinate and empty your bladder as soon as you feel the need. Do not hold your urine for long periods of time.  Wipe from front to back after you urinate or have a bowel movement. This will help prevent germs from getting into your urinary tract through your urethra.  Drink liquids as directed. Ask how much liquid to drink each day and which liquids are best for you. You may need to drink more liquids than usual to help flush out  the bacteria. Do not drink alcohol, caffeine, or citrus juices. These can irritate your bladder and increase your symptoms. Your healthcare provider may recommend cranberry juice to help prevent a UTI.  Urinate before and after you have sex. This can help flush out bacteria passed during sex.  Do not douche or use feminine deodorants. These can change the chemical balance in your vagina.  Change sanitary pads or tampons often. This will help prevent germs from getting into your urinary tract.  Talk to your healthcare provider about your birth control method. You may need to change your method if it is increasing your risk for UTIs.  Wear cotton underwear and clothes that are loose. Tight pants and nylon underwear can trap moisture and cause bacteria to grow.  Vaginal estrogen may be recommended. This medicine helps prevent UTIs in women who have gone through menopause or are in morgan-menopause.  Do pelvic muscle exercises often. Pelvic muscle exercises may help you start and stop urinating. Strong pelvic muscles may help you empty your bladder easier. Squeeze these muscles tightly for 5 seconds like you are trying to hold back urine. Then relax for 5 seconds. Gradually work up to squeezing for 10 seconds. Do 3 sets of 15 repetitions a day, or as directed.     Urinary frequency  -     POCT Urinalysis, Dipstick, Automated, W/O Scope    Urinary tract infection with hematuria, site unspecified

## 2024-11-29 NOTE — PATIENT INSTRUCTIONS
A urinary tract infection (UTI) is caused by bacteria that get inside your urinary tract. Your urinary tract includes your kidneys, ureters, bladder, and urethra. A UTI is more common in your lower urinary tract, which includes your bladder and urethra.      DISCHARGE INSTRUCTIONS:  Seek care immediately if:    You are urinating very little or not at all.  You have a high fever with shaking chills.  You have side or back pain that gets worse.  Call your doctor if:  You have a fever.  You do not feel better after 2 days of taking antibiotics.  You have new symptoms, such as blood or pus in your urine.  You are vomiting.  You have questions or concerns about your condition or care.    Medicines:  Antibiotics treat a bacterial infection. If you have UTIs often (called recurrent UTIs), you may be given antibiotics to take regularly. You will be given directions for when and how to use antibiotics. The goal is to prevent UTIs but not cause antibiotic resistance by using antibiotics too often.  Medicines may be given to decrease pain and burning when you urinate. They will also help decrease the feeling that you need to urinate often. These medicines may make your urine orange or red.  Take your medicine as directed. Contact your healthcare provider if you think your medicine is not helping or if you have side effects. Tell your provider if you are allergic to any medicine. Keep a list of the medicines, vitamins, and herbs you take. Include the amounts, and when and why you take them. Bring the list or the pill bottles to follow-up visits. Carry your medicine list with you in case of an emergency.  Follow up with your doctor as directed:  Write down your questions so you remember to ask them during your visits.    Prevent another UTI:  Empty your bladder often. Urinate and empty your bladder as soon as you feel the need. Do not hold your urine for long periods of time.  Wipe from front to back after you urinate or have  a bowel movement. This will help prevent germs from getting into your urinary tract through your urethra.  Drink liquids as directed. Ask how much liquid to drink each day and which liquids are best for you. You may need to drink more liquids than usual to help flush out the bacteria. Do not drink alcohol, caffeine, or citrus juices. These can irritate your bladder and increase your symptoms. Your healthcare provider may recommend cranberry juice to help prevent a UTI.  Urinate before and after you have sex. This can help flush out bacteria passed during sex.  Do not douche or use feminine deodorants. These can change the chemical balance in your vagina.  Change sanitary pads or tampons often. This will help prevent germs from getting into your urinary tract.  Talk to your healthcare provider about your birth control method. You may need to change your method if it is increasing your risk for UTIs.  Wear cotton underwear and clothes that are loose. Tight pants and nylon underwear can trap moisture and cause bacteria to grow.  Vaginal estrogen may be recommended. This medicine helps prevent UTIs in women who have gone through menopause or are in morgan-menopause.  Do pelvic muscle exercises often. Pelvic muscle exercises may help you start and stop urinating. Strong pelvic muscles may help you empty your bladder easier. Squeeze these muscles tightly for 5 seconds like you are trying to hold back urine. Then relax for 5 seconds. Gradually work up to squeezing for 10 seconds. Do 3 sets of 15 repetitions a day, or as directed.

## 2024-12-01 NOTE — TELEPHONE ENCOUNTER
Please let her know I was out of town, ask if her symptoms have improved, she went to walk in clinic

## 2024-12-11 ENCOUNTER — TELEPHONE (OUTPATIENT)
Dept: INTERNAL MEDICINE | Facility: CLINIC | Age: 74
End: 2024-12-11
Payer: MEDICARE

## 2024-12-11 DIAGNOSIS — N30.00 ACUTE CYSTITIS WITHOUT HEMATURIA: Primary | ICD-10-CM

## 2024-12-11 RX ORDER — NITROFURANTOIN 25; 75 MG/1; MG/1
100 CAPSULE ORAL 2 TIMES DAILY
Qty: 20 CAPSULE | Refills: 0 | Status: SHIPPED | OUTPATIENT
Start: 2024-12-11 | End: 2024-12-21

## 2024-12-11 NOTE — TELEPHONE ENCOUNTER
----- Message from Alysha sent at 12/11/2024 12:16 PM CST -----  Who Called: Sandra Crowe    Caller is requesting assistance/information from provider's office.    Symptoms (please be specific): recurring UTI , burning, spasms   How long has patient had these symptoms:  2 days  List of preferred pharmacies on file (remove unneeded): [unfilled]  If different, enter pharmacy into here including location and phone number: [facility] [address] [place] [address]   Phone: [phone]  Fax: [phone]    Preferred Method of Contact: Phone Call  Patient's Preferred Phone Number on File: [phone]   Best Call Back Number, if different:  Additional Information: medical advice, please advise, thanks

## 2024-12-12 ENCOUNTER — HOSPITAL ENCOUNTER (OUTPATIENT)
Dept: RADIOLOGY | Facility: HOSPITAL | Age: 74
Discharge: HOME | End: 2024-12-12
Attending: INTERNAL MEDICINE
Payer: MEDICARE

## 2024-12-12 DIAGNOSIS — Z12.31 VISIT FOR SCREENING MAMMOGRAM: ICD-10-CM

## 2024-12-12 PROCEDURE — 77067 SCR MAMMO BI INCL CAD: CPT | Mod: TC

## 2024-12-12 PROCEDURE — 77063 BREAST TOMOSYNTHESIS BI: CPT | Mod: 26,,, | Performed by: STUDENT IN AN ORGANIZED HEALTH CARE EDUCATION/TRAINING PROGRAM

## 2024-12-12 PROCEDURE — 77067 SCR MAMMO BI INCL CAD: CPT | Mod: 26,,, | Performed by: STUDENT IN AN ORGANIZED HEALTH CARE EDUCATION/TRAINING PROGRAM

## 2024-12-18 ENCOUNTER — TELEPHONE (OUTPATIENT)
Dept: INTERNAL MEDICINE | Facility: CLINIC | Age: 74
End: 2024-12-18
Payer: MEDICARE

## 2024-12-18 NOTE — TELEPHONE ENCOUNTER
----- Message from [employer] sent at 12/18/2024 11:58 AM CST -----  .Type:  Needs Medical Advice    Who Called: pt  Symptoms (please be specific): Sinus infection   How long has patient had these symptoms:  Yesterday  Pharmacy name and phone #:  [facility] [address]  Would the patient rather a call back or a response via [facility]?   Best Call Back Number: [phone]  Additional Information: Please call something in for sinus infection

## 2024-12-18 NOTE — TELEPHONE ENCOUNTER
Spoke with patient at this time. Explained she is currently on abx and only can recommend otc meds.

## 2024-12-26 ENCOUNTER — OFFICE VISIT (OUTPATIENT)
Dept: URGENT CARE | Facility: CLINIC | Age: 74
End: 2024-12-26
Payer: MEDICARE

## 2024-12-26 VITALS
HEIGHT: 66 IN | OXYGEN SATURATION: 97 % | BODY MASS INDEX: 23.46 KG/M2 | DIASTOLIC BLOOD PRESSURE: 77 MMHG | HEART RATE: 59 BPM | TEMPERATURE: 98 F | RESPIRATION RATE: 18 BRPM | WEIGHT: 146 LBS | SYSTOLIC BLOOD PRESSURE: 133 MMHG

## 2024-12-26 DIAGNOSIS — R05.9 COUGH, UNSPECIFIED TYPE: Primary | ICD-10-CM

## 2024-12-26 DIAGNOSIS — J40 BRONCHITIS: ICD-10-CM

## 2024-12-26 PROCEDURE — 99213 OFFICE O/P EST LOW 20 MIN: CPT | Mod: ,,, | Performed by: NUTRITIONIST

## 2024-12-26 RX ORDER — AZELASTINE 1 MG/ML
1 SPRAY, METERED NASAL 2 TIMES DAILY
Qty: 30 ML | Refills: 0 | Status: SHIPPED | OUTPATIENT
Start: 2024-12-26 | End: 2025-12-26

## 2024-12-26 RX ORDER — DOXYCYCLINE HYCLATE 100 MG
100 TABLET ORAL 2 TIMES DAILY
Qty: 20 TABLET | Refills: 0 | Status: SHIPPED | OUTPATIENT
Start: 2024-12-26 | End: 2025-01-05

## 2024-12-26 RX ORDER — BENZONATATE 200 MG/1
200 CAPSULE ORAL 3 TIMES DAILY PRN
Qty: 21 CAPSULE | Refills: 0 | Status: SHIPPED | OUTPATIENT
Start: 2024-12-26 | End: 2025-01-05

## 2024-12-26 NOTE — PROGRESS NOTES
"Subjective:      Patient ID: Sandra Crowe is a 74 y.o. female.    Vitals:  height is 5' 6" (1.676 m) and weight is 66.2 kg (146 lb). Her oral temperature is 98.1 °F (36.7 °C). Her blood pressure is 133/77 and her pulse is 59 (abnormal). Her respiration is 18 and oxygen saturation is 97%.     Chief Complaint: Cough     Patient is a 74 y.o. female who presents to urgent care with complaints of chest congestion, cough, ear pressure, runny nose  x 2 weeks. Alleviating factors include Zyrtec, Mucinex and azelastine nasal spray  with mild amount of relief. Patient denies fever, chills, body aches, nausea, vomiting, sore throat.  Patient admits to allergy to NSAIDs, brimonidine, codeine    Cough  Associated symptoms include postnasal drip and a sore throat. Pertinent negatives include no chest pain, hemoptysis, rash, shortness of breath or wheezing.     HENT:  Positive for congestion, postnasal drip and sore throat. Negative for ear discharge, drooling, facial swelling and trouble swallowing.    Neck: Negative for neck pain and neck stiffness.   Cardiovascular:  Negative for chest pain.   Respiratory:  Positive for chest tightness, cough and sputum production. Negative for bloody sputum, shortness of breath and wheezing.    Gastrointestinal:  Negative for abdominal pain, vomiting and diarrhea.   Skin:  Negative for rash.      Objective:        Previous History      Review of patient's allergies indicates:   Allergen Reactions    Adhesive Other (See Comments)     "Tears my skin"    Brimonidine tartrate (bulk) Other (See Comments)    Codeine      Other reaction(s): Nauseated    Ivp dye [iodinated contrast media] Hives    Nsaids (non-steroidal anti-inflammatory drug)      Other reaction(s): Swelling       Past Medical History:   Diagnosis Date    Personal history of colonic polyps     Unspecified glaucoma     Vitamin D deficiency      Current Outpatient Medications   Medication Instructions    albuterol (PROVENTIL HFA) 90 " "mcg/actuation inhaler 2 puffs, Inhalation, Every 6 hours PRN, Rescue    aspirin (ECOTRIN) 81 mg, Daily    azelastine (ASTELIN) 137 mcg, Nasal, 2 times daily    azelastine (ASTELIN) 137 mcg, Nasal, 2 times daily    benzonatate (TESSALON) 200 mg, Oral, 3 times daily PRN    brinzolamide (AZOPT) 1 % ophthalmic suspension 1 drop, 3 times daily    CEQUA 0.09 % Dpet 1 drop, 2 times daily    doxycycline (VIBRA-TABS) 100 mg, Oral, 2 times daily    latanoprost 0.005 % ophthalmic solution 1 drop, Nightly    triamterene-hydrochlorothiazide 37.5-25 mg (MAXZIDE-25) 37.5-25 mg per tablet 1 tablet, Oral, Daily     Past Surgical History:   Procedure Laterality Date    CATARACT EXTRACTION      COLONOSCOPY  11/27/2023    COLONOSCOPY W/ POLYPECTOMY  11/11/2016    DESTRUCTION, CILIARY BODY, USING LASER Right 6/11/2024    Procedure: IRIDEX - OD;  Surgeon: Elodia Elizondo MD;  Location: [facility] OR;  Service: Ophthalmology;  Laterality: Right;    DESTRUCTION, CILIARY BODY, USING LASER Left 7/9/2024    Procedure: IRIDEX 4/15 - OS;  Surgeon: Elodia Elizondo MD;  Location: [facility] OR;  Service: Ophthalmology;  Laterality: Left;    EYE SURGERY  2015    FRACTURE SURGERY  1956    HYSTERECTOMY  2001    PARTIAL HYSTERECTOMY  08/04/2001    TRANSFORAMINAL EPIDURAL INJECTION OF STEROID Left 02/16/2022    TRANSFORAMINAL EPIDURAL INJECTION OF STEROID Left 01/19/2022     Family History   Problem Relation Name Age of Onset    Glaucoma Mother      Coronary artery disease Father         Social History     Tobacco Use    Smoking status: Never    Smokeless tobacco: Never   Substance Use Topics    Alcohol use: Never    Drug use: Never        Physical Exam      Vital Signs Reviewed   /77   Pulse (!) 59   Temp 98.1 °F (36.7 °C) (Oral)   Resp 18   Ht 5' 6" (1.676 m)   Wt 66.2 kg (146 lb)   SpO2 97%   BMI 23.57 kg/m²        Procedures    Procedures     Labs     Results for orders placed or performed in visit on 11/29/24   POCT Urinalysis, Dipstick, " Automated, W/O Scope    Collection Time: 11/29/24 12:02 PM   Result Value Ref Range    POC Blood, Urine Positive (A) Negative    POC Bilirubin, Urine Positive (A) Negative    POC Urobilinogen, Urine positive 0.1 - 1.1    POC Ketones, Urine Negative Negative    POC Protein, Urine Negative Negative    POC Nitrates, Urine Positive (A) Negative    POC Glucose, Urine Negative Negative    pH, UA 7.0     POC Specific Gravity, Urine 1.010 1.003 - 1.029    POC Leukocytes, Urine Positive (A) Negative       Physical Exam   Constitutional: She appears well-developed.  Non-toxic appearance. She does not appear ill. No distress.   HENT:   Head: Atraumatic.   Ears:   Right Ear: Tympanic membrane, external ear and ear canal normal.   Left Ear: Tympanic membrane, external ear and ear canal normal.   Nose: Rhinorrhea and congestion present. No purulent discharge. Right sinus exhibits no maxillary sinus tenderness and no frontal sinus tenderness. Left sinus exhibits no maxillary sinus tenderness and no frontal sinus tenderness.   Mouth/Throat: Uvula is midline. Mucous membranes are moist. Posterior oropharyngeal erythema present.   Eyes: Right eye exhibits no discharge. Left eye exhibits no discharge. Extraocular movement intact   Neck: Neck supple. No neck rigidity present.   Cardiovascular: Regular rhythm, normal heart sounds and normal pulses.   Pulmonary/Chest: Effort normal and breath sounds normal. No respiratory distress. She has no wheezes. She has no rhonchi. She has no rales.   Lymphadenopathy:     She has no cervical adenopathy.   Neurological: She is alert.   Skin: Skin is warm, dry and not diaphoretic.   Psychiatric: Her behavior is normal.   Nursing note and vitals reviewed.      Assessment:     1. Cough, unspecified type    2. Bronchitis        Plan:   Cough and congestion    You will be given an antibiotic, due to the progression of your symptoms past 12 days.  I will give you a nasal spray for postnasal drip, as well  as some Tessalon Perles for cough.  Medications sent to pharmacy.    Doxycycline will be given to cover respiratory infection.    Drink plenty of fluids.     Get plenty of rest.     You may continue over-the-counter antibiotics such as Zyrtec, Flonase, Allegra.    Tylenol or Motrin as needed.     Go to the ER with any significant change or worsening of symptoms.     Follow up with your primary care doctor.     Cough, unspecified type    Bronchitis    Other orders  -     doxycycline (VIBRA-TABS) 100 MG tablet; Take 1 tablet (100 mg total) by mouth 2 (two) times daily. for 10 days  Dispense: 20 tablet; Refill: 0  -     azelastine (ASTELIN) 137 mcg (0.1 %) nasal spray; 1 spray (137 mcg total) by Nasal route 2 (two) times daily.  Dispense: 30 mL; Refill: 0  -     benzonatate (TESSALON) 200 MG capsule; Take 1 capsule (200 mg total) by mouth 3 (three) times daily as needed for Cough.  Dispense: 21 capsule; Refill: 0

## 2024-12-26 NOTE — PATIENT INSTRUCTIONS
Cough and congestion    You will be given an antibiotic, due to the progression of your symptoms past 12 days.  I will give you a nasal spray for postnasal drip, as well as some Tessalon Perles for cough.  Medications sent to pharmacy.    Drink plenty of fluids.     Get plenty of rest.     You may continue over-the-counter antibiotics such as Zyrtec, Flonase, Allegra.    Tylenol or Motrin as needed.     Go to the ER with any significant change or worsening of symptoms.     Follow up with your primary care doctor.

## 2025-01-16 ENCOUNTER — OFFICE VISIT (OUTPATIENT)
Dept: URGENT CARE | Facility: CLINIC | Age: 75
End: 2025-01-16
Payer: MEDICARE

## 2025-01-16 VITALS
BODY MASS INDEX: 23.46 KG/M2 | TEMPERATURE: 98 F | HEART RATE: 61 BPM | SYSTOLIC BLOOD PRESSURE: 131 MMHG | RESPIRATION RATE: 18 BRPM | OXYGEN SATURATION: 98 % | WEIGHT: 146 LBS | DIASTOLIC BLOOD PRESSURE: 79 MMHG | HEIGHT: 66 IN

## 2025-01-16 DIAGNOSIS — R22.42 LOCALIZED SWELLING OF LEFT FOOT: Primary | ICD-10-CM

## 2025-01-16 DIAGNOSIS — M79.672 LEFT FOOT PAIN: ICD-10-CM

## 2025-01-16 PROCEDURE — 99214 OFFICE O/P EST MOD 30 MIN: CPT | Mod: ,,, | Performed by: FAMILY MEDICINE

## 2025-01-16 RX ORDER — MUPIROCIN 20 MG/G
OINTMENT TOPICAL 2 TIMES DAILY
Qty: 5 G | Refills: 0 | Status: SHIPPED | OUTPATIENT
Start: 2025-01-16 | End: 2025-01-21

## 2025-01-16 RX ORDER — PREDNISONE 20 MG/1
40 TABLET ORAL DAILY
Qty: 6 TABLET | Refills: 0 | Status: SHIPPED | OUTPATIENT
Start: 2025-01-16 | End: 2025-01-19

## 2025-01-16 RX ORDER — SULFAMETHOXAZOLE AND TRIMETHOPRIM 800; 160 MG/1; MG/1
1 TABLET ORAL 2 TIMES DAILY
Qty: 10 TABLET | Refills: 0 | Status: SHIPPED | OUTPATIENT
Start: 2025-01-16 | End: 2025-01-21

## 2025-01-16 NOTE — PATIENT INSTRUCTIONS
Assessment/Plan:   Localized swelling of left foot  -     XR FOOT COMPLETE 3 VIEW LEFT; Future; Expected date: 01/16/2025  Gout versus other.  Preliminary interpretation of x-ray of the left foot negative for acute fracture/dislocation/other.     Consider infectious process should in no improvement occur with oral steroids.  Return to clinic/follow-up should symptoms fail to improve or worsen.  Left foot pain  -     XR FOOT COMPLETE 3 VIEW LEFT; Future; Expected date: 01/16/2025     Other orders  -     predniSONE (DELTASONE) 20 MG tablet; Take 2 tablets (40 mg total) by mouth once daily. With food for 3 days  Dispense: 6 tablet; Refill: 0  -     sulfamethoxazole-trimethoprim 800-160mg (BACTRIM DS) 800-160 mg Tab; Take 1 tablet by mouth 2 (two) times daily. for 5 days  Dispense: 10 tablet; Refill: 0  -     mupirocin (BACTROBAN) 2 % ointment; Apply topically 2 (two) times daily. for 5 days  Dispense: 5 g; Refill: 0            Orders Placed This Encounter   Procedures    XR FOOT COMPLETE 3 VIEW LEFT       Education and counseling done face to face regarding medical conditions and plan. Contact office if new symptoms develop. Should any symptoms ever significantly worsen seek emergency medical attention/go to ER. Follow up at least yearly for wellness or sooner PRN. Nurse to call patient with any results. The patient is receptive, expresses understanding and is agreeable to plan. All questions have been answered.

## 2025-01-16 NOTE — PROGRESS NOTES
"Patient ID: Sandra Crowe is a 74 y.o. female.  Chief Complaint: Foot Pain    HPI:   Patient presents here today for above reason.     Patient is a 74 y.o. female who presents to urgent care with complaints of left foot pain, discoloration (purple), and swelling located primarily over toes/metatarsals x yesterday. Alleviating factors include Tylenol with mild amount of relief. Patient denies any type of injury to the foot, numbness, tingling, or any other symptoms. Patient reports hx of bunion on same foot.     Past Medical History:  Past Medical History:   Diagnosis Date    Personal history of colonic polyps     Unspecified glaucoma     Vitamin D deficiency      Past Surgical History:   Procedure Laterality Date    CATARACT EXTRACTION      COLONOSCOPY  11/27/2023    COLONOSCOPY W/ POLYPECTOMY  11/11/2016    DESTRUCTION, CILIARY BODY, USING LASER Right 6/11/2024    Procedure: IRIDEX - OD;  Surgeon: Elodia Elizondo MD;  Location: [facility] OR;  Service: Ophthalmology;  Laterality: Right;    DESTRUCTION, CILIARY BODY, USING LASER Left 7/9/2024    Procedure: IRIDEX 4/15 - OS;  Surgeon: Elodia Elizondo MD;  Location: [facility] OR;  Service: Ophthalmology;  Laterality: Left;    EYE SURGERY  2015    FRACTURE SURGERY  1956    HYSTERECTOMY  2001    PARTIAL HYSTERECTOMY  08/04/2001    TRANSFORAMINAL EPIDURAL INJECTION OF STEROID Left 02/16/2022    TRANSFORAMINAL EPIDURAL INJECTION OF STEROID Left 01/19/2022     Review of patient's allergies indicates:   Allergen Reactions    Adhesive Other (See Comments)     "Tears my skin"    Brimonidine tartrate (bulk) Other (See Comments)    Codeine      Other reaction(s): Nauseated    Ivp dye [iodinated contrast media] Hives    Nsaids (non-steroidal anti-inflammatory drug)      Other reaction(s): Swelling     Current Outpatient Medications   Medication Instructions    albuterol (PROVENTIL HFA) 90 mcg/actuation inhaler 2 puffs, Inhalation, Every 6 hours PRN, Rescue    aspirin (ECOTRIN) 81 " "mg, Daily    azelastine (ASTELIN) 137 mcg, Nasal, 2 times daily    brinzolamide (AZOPT) 1 % ophthalmic suspension 1 drop, 3 times daily    CEQUA 0.09 % Dpet 1 drop, 2 times daily    latanoprost 0.005 % ophthalmic solution 1 drop, Nightly    mupirocin (BACTROBAN) 2 % ointment Topical (Top), 2 times daily    predniSONE (DELTASONE) 40 mg, Oral, Daily, With food    sulfamethoxazole-trimethoprim 800-160mg (BACTRIM DS) 800-160 mg Tab 1 tablet, Oral, 2 times daily    triamterene-hydrochlorothiazide 37.5-25 mg (MAXZIDE-25) 37.5-25 mg per tablet 1 tablet, Oral, Daily           ROS:   Review of Systems  12 point review of systems conducted, negative except as stated in the history of present illness. See HPI for details.   Vitals/PE:   Visit Vitals  /79   Pulse 61   Temp 98.3 °F (36.8 °C)   Resp 18   Ht 5' 6" (1.676 m)   Wt 66.2 kg (146 lb)   SpO2 98%   BMI 23.57 kg/m²     Physical Exam  Vitals and nursing note reviewed.   Constitutional:       General: She is not in acute distress.     Appearance: Normal appearance. She is not ill-appearing, toxic-appearing or diaphoretic.   HENT:      Head: Normocephalic and atraumatic.      Right Ear: Tympanic membrane normal.      Left Ear: Tympanic membrane normal.      Mouth/Throat:      Mouth: Mucous membranes are dry.      Pharynx: Oropharynx is clear.   Eyes:      Extraocular Movements: Extraocular movements intact.      Conjunctiva/sclera: Conjunctivae normal.      Pupils: Pupils are equal, round, and reactive to light.   Neck:      Vascular: No carotid bruit.   Cardiovascular:      Rate and Rhythm: Normal rate and regular rhythm.      Pulses: Normal pulses.      Heart sounds: Normal heart sounds. No murmur heard.     No friction rub. No gallop.   Pulmonary:      Effort: Pulmonary effort is normal. No respiratory distress.      Breath sounds: No stridor. No wheezing or rhonchi.   Abdominal:      General: There is no distension.      Palpations: There is no mass.      " Tenderness: There is no abdominal tenderness. There is no left CVA tenderness, guarding or rebound.      Hernia: No hernia is present.   Musculoskeletal:         General: No swelling or signs of injury. Normal range of motion.      Cervical back: Normal range of motion and neck supple. No rigidity or tenderness.      Right lower leg: No edema.      Left lower leg: No edema.        Feet:    Lymphadenopathy:      Cervical: No cervical adenopathy.   Skin:     Capillary Refill: Capillary refill takes less than 2 seconds.      Coloration: Skin is not jaundiced.      Findings: No bruising, lesion or rash.   Neurological:      General: No focal deficit present.      Mental Status: She is alert and oriented to person, place, and time. Mental status is at baseline.      Cranial Nerves: No cranial nerve deficit.      Sensory: No sensory deficit.      Motor: No weakness.      Coordination: Coordination normal.      Gait: Gait normal.   Psychiatric:         Mood and Affect: Mood normal.         Behavior: Behavior normal.         Thought Content: Thought content normal.         Judgment: Judgment normal.         Results for orders placed or performed in visit on 11/29/24   POCT Urinalysis, Dipstick, Automated, W/O Scope    Collection Time: 11/29/24 12:02 PM   Result Value Ref Range    POC Blood, Urine Positive (A) Negative    POC Bilirubin, Urine Positive (A) Negative    POC Urobilinogen, Urine positive 0.1 - 1.1    POC Ketones, Urine Negative Negative    POC Protein, Urine Negative Negative    POC Nitrates, Urine Positive (A) Negative    POC Glucose, Urine Negative Negative    pH, UA 7.0     POC Specific Gravity, Urine 1.010 1.003 - 1.029    POC Leukocytes, Urine Positive (A) Negative     Assessment/Plan:   Localized swelling of left foot  -     XR FOOT COMPLETE 3 VIEW LEFT; Future; Expected date: 01/16/2025  Gout versus other.  Preliminary interpretation of x-ray of the left foot negative for acute  fracture/dislocation/other.    Consider infectious process should in no improvement occur with oral steroids.  Return to clinic/follow-up should symptoms fail to improve or worsen.  Left foot pain  -     XR FOOT COMPLETE 3 VIEW LEFT; Future; Expected date: 01/16/2025    Other orders  -     predniSONE (DELTASONE) 20 MG tablet; Take 2 tablets (40 mg total) by mouth once daily. With food for 3 days  Dispense: 6 tablet; Refill: 0  -     sulfamethoxazole-trimethoprim 800-160mg (BACTRIM DS) 800-160 mg Tab; Take 1 tablet by mouth 2 (two) times daily. for 5 days  Dispense: 10 tablet; Refill: 0  -     mupirocin (BACTROBAN) 2 % ointment; Apply topically 2 (two) times daily. for 5 days  Dispense: 5 g; Refill: 0       Orders Placed This Encounter   Procedures    XR FOOT COMPLETE 3 VIEW LEFT       Education and counseling done face to face regarding medical conditions and plan. Contact office if new symptoms develop. Should any symptoms ever significantly worsen seek emergency medical attention/go to ER. Follow up at least yearly for wellness or sooner PRN. Nurse to call patient with any results. The patient is receptive, expresses understanding and is agreeable to plan. All questions have been answered.

## 2025-02-24 DIAGNOSIS — I10 PRIMARY HYPERTENSION: ICD-10-CM

## 2025-02-24 RX ORDER — TRIAMTERENE/HYDROCHLOROTHIAZID 37.5-25 MG
1 TABLET ORAL DAILY
Qty: 90 TABLET | Refills: 3 | Status: SHIPPED | OUTPATIENT
Start: 2025-02-24 | End: 2026-02-24

## 2025-05-04 ENCOUNTER — HOSPITAL ENCOUNTER (EMERGENCY)
Facility: HOSPITAL | Age: 75
Discharge: HOME | End: 2025-05-04
Attending: EMERGENCY MEDICINE
Payer: MEDICARE

## 2025-05-04 VITALS
WEIGHT: 144 LBS | RESPIRATION RATE: 16 BRPM | SYSTOLIC BLOOD PRESSURE: 152 MMHG | OXYGEN SATURATION: 96 % | DIASTOLIC BLOOD PRESSURE: 81 MMHG | BODY MASS INDEX: 22.6 KG/M2 | HEIGHT: 67 IN | HEART RATE: 67 BPM | TEMPERATURE: 99 F

## 2025-05-04 DIAGNOSIS — S39.012A STRAIN OF LUMBAR REGION, INITIAL ENCOUNTER: Primary | ICD-10-CM

## 2025-05-04 LAB
BILIRUB UR QL STRIP.AUTO: NEGATIVE
CLARITY UR: CLEAR
COLOR UR AUTO: NORMAL
GLUCOSE UR QL STRIP: NEGATIVE
HGB UR QL STRIP: NEGATIVE
KETONES UR QL STRIP: NEGATIVE
LEUKOCYTE ESTERASE UR QL STRIP: NEGATIVE
NITRITE UR QL STRIP: NEGATIVE
PH UR STRIP: 7.5 [PH]
PROT UR QL STRIP: NEGATIVE
SP GR UR STRIP.AUTO: 1.02 (ref 1–1.03)
UROBILINOGEN UR STRIP-ACNC: 0.2

## 2025-05-04 PROCEDURE — 81003 URINALYSIS AUTO W/O SCOPE: CPT | Performed by: EMERGENCY MEDICINE

## 2025-05-04 PROCEDURE — 99284 EMERGENCY DEPT VISIT MOD MDM: CPT | Mod: 25

## 2025-05-04 PROCEDURE — 25000003 PHARM REV CODE 250: Performed by: EMERGENCY MEDICINE

## 2025-05-04 PROCEDURE — 96372 THER/PROPH/DIAG INJ SC/IM: CPT | Performed by: EMERGENCY MEDICINE

## 2025-05-04 PROCEDURE — 96375 TX/PRO/DX INJ NEW DRUG ADDON: CPT

## 2025-05-04 PROCEDURE — 96374 THER/PROPH/DIAG INJ IV PUSH: CPT

## 2025-05-04 PROCEDURE — 96361 HYDRATE IV INFUSION ADD-ON: CPT

## 2025-05-04 PROCEDURE — 63600175 PHARM REV CODE 636 W HCPCS: Performed by: EMERGENCY MEDICINE

## 2025-05-04 RX ORDER — MORPHINE SULFATE 4 MG/ML
6 INJECTION, SOLUTION INTRAMUSCULAR; INTRAVENOUS
Status: COMPLETED | OUTPATIENT
Start: 2025-05-04 | End: 2025-05-04

## 2025-05-04 RX ORDER — DEXAMETHASONE SODIUM PHOSPHATE 4 MG/ML
8 INJECTION, SOLUTION INTRA-ARTICULAR; INTRALESIONAL; INTRAMUSCULAR; INTRAVENOUS; SOFT TISSUE
Status: COMPLETED | OUTPATIENT
Start: 2025-05-04 | End: 2025-05-04

## 2025-05-04 RX ORDER — ONDANSETRON 4 MG/1
4 TABLET, ORALLY DISINTEGRATING ORAL
Status: COMPLETED | OUTPATIENT
Start: 2025-05-04 | End: 2025-05-04

## 2025-05-04 RX ORDER — PROCHLORPERAZINE EDISYLATE 5 MG/ML
5 INJECTION INTRAMUSCULAR; INTRAVENOUS
Status: COMPLETED | OUTPATIENT
Start: 2025-05-04 | End: 2025-05-04

## 2025-05-04 RX ORDER — SODIUM CHLORIDE 9 MG/ML
1000 INJECTION, SOLUTION INTRAVENOUS
Status: COMPLETED | OUTPATIENT
Start: 2025-05-04 | End: 2025-05-04

## 2025-05-04 RX ORDER — METHOCARBAMOL 750 MG/1
1500 TABLET, FILM COATED ORAL 3 TIMES DAILY
Qty: 30 TABLET | Refills: 0 | Status: SHIPPED | OUTPATIENT
Start: 2025-05-04 | End: 2025-05-09

## 2025-05-04 RX ORDER — PROCHLORPERAZINE 25 MG/1
25 SUPPOSITORY RECTAL EVERY 12 HOURS PRN
Qty: 10 SUPPOSITORY | Refills: 0 | Status: SHIPPED | OUTPATIENT
Start: 2025-05-04

## 2025-05-04 RX ORDER — MORPHINE SULFATE 4 MG/ML
6 INJECTION, SOLUTION INTRAMUSCULAR; INTRAVENOUS
Status: DISCONTINUED | OUTPATIENT
Start: 2025-05-04 | End: 2025-05-04

## 2025-05-04 RX ORDER — TRAMADOL HYDROCHLORIDE 50 MG/1
50 TABLET ORAL EVERY 6 HOURS PRN
Qty: 12 TABLET | Refills: 0 | Status: SHIPPED | OUTPATIENT
Start: 2025-05-04

## 2025-05-04 RX ORDER — ONDANSETRON 4 MG/1
4 TABLET, FILM COATED ORAL EVERY 6 HOURS
Qty: 12 TABLET | Refills: 0 | Status: SHIPPED | OUTPATIENT
Start: 2025-05-04

## 2025-05-04 RX ADMIN — SODIUM CHLORIDE 1000 ML: 9 INJECTION, SOLUTION INTRAVENOUS at 01:05

## 2025-05-04 RX ADMIN — ONDANSETRON 4 MG: 4 TABLET, ORALLY DISINTEGRATING ORAL at 12:05

## 2025-05-04 RX ADMIN — MORPHINE SULFATE 6 MG: 4 INJECTION INTRAVENOUS at 11:05

## 2025-05-04 RX ADMIN — PROCHLORPERAZINE EDISYLATE 5 MG: 5 INJECTION INTRAMUSCULAR; INTRAVENOUS at 01:05

## 2025-05-04 RX ADMIN — DEXAMETHASONE SODIUM PHOSPHATE 8 MG: 4 INJECTION, SOLUTION INTRA-ARTICULAR; INTRALESIONAL; INTRAMUSCULAR; INTRAVENOUS; SOFT TISSUE at 01:05

## 2025-05-04 RX ADMIN — ONDANSETRON 4 MG: 4 TABLET, ORALLY DISINTEGRATING ORAL at 11:05

## 2025-05-04 NOTE — ED TRIAGE NOTES
Pt complaint of pain to lower back radiating to left hip and thigh area since yesterday and ambulatory with a cane

## 2025-05-04 NOTE — ED PROVIDER NOTES
"Encounter Date: 5/4/2025       History     Chief Complaint   Patient presents with    Back Pain     Pt complaint of pain to lower back radiating to left hip and thigh area since yesterday and ambulatory with a cane     75-year-old female history of hypertension, hysterectomy was in her usual state of health until yesterday when she started to have left buttock pain radiating to her groin and left anterior thigh.  Pain is worse with movement better with rest.  She does not recall any injury.  No lower extremity weakness or numbness.  No incontinence or retention.  Patient has been taking Tylenol because she is allergic to NSAIDs.    The history is provided by the patient.     Review of patient's allergies indicates:   Allergen Reactions    Adhesive Other (See Comments)     "Tears my skin"    Brimonidine tartrate (bulk) Other (See Comments)    Codeine      Other reaction(s): Nauseated    Ivp dye [iodinated contrast media] Hives    Nsaids (non-steroidal anti-inflammatory drug)      Other reaction(s): Swelling     Past Medical History:   Diagnosis Date    Personal history of colonic polyps     Unspecified glaucoma     Vitamin D deficiency      Past Surgical History:   Procedure Laterality Date    CATARACT EXTRACTION      COLONOSCOPY  11/27/2023    COLONOSCOPY W/ POLYPECTOMY  11/11/2016    DESTRUCTION, CILIARY BODY, USING LASER Right 6/11/2024    Procedure: IRIDEX - OD;  Surgeon: Elodia Elizondo MD;  Location: [facility] OR;  Service: Ophthalmology;  Laterality: Right;    DESTRUCTION, CILIARY BODY, USING LASER Left 7/9/2024    Procedure: IRIDEX 4/15 - OS;  Surgeon: Elodia Elizondo MD;  Location: [facility] OR;  Service: Ophthalmology;  Laterality: Left;    EYE SURGERY  2015    FRACTURE SURGERY  1956    HYSTERECTOMY  2001    PARTIAL HYSTERECTOMY  08/04/2001    TRANSFORAMINAL EPIDURAL INJECTION OF STEROID Left 02/16/2022    TRANSFORAMINAL EPIDURAL INJECTION OF STEROID Left 01/19/2022     Family History   Problem Relation Name " Age of Onset    Glaucoma Mother      Coronary artery disease Father       Social History[1]  Review of Systems   Constitutional:  Negative for chills, diaphoresis, fatigue and fever.   HENT:  Negative for congestion, drooling, ear discharge, ear pain, postnasal drip, rhinorrhea, sinus pressure, sinus pain, sore throat and tinnitus.    Eyes:  Negative for discharge.   Respiratory:  Negative for cough, chest tightness, shortness of breath and wheezing.    Cardiovascular:  Negative for chest pain and palpitations.   Gastrointestinal:  Negative for abdominal pain, diarrhea, nausea and vomiting.   Genitourinary:  Negative for frequency, hematuria and urgency.   Musculoskeletal:  Positive for back pain. Negative for neck pain and neck stiffness.   Skin:  Negative for rash.   Neurological:  Negative for syncope, weakness, light-headedness, numbness and headaches.   Psychiatric/Behavioral:  Negative for suicidal ideas.        Physical Exam     Initial Vitals [05/04/25 1013]   BP Pulse Resp Temp SpO2   (!) 174/96 78 18 98.6 °F (37 °C) 100 %      MAP       --         Physical Exam    Nursing note and vitals reviewed.  Constitutional: No distress.   HENT: Mouth/Throat: Oropharynx is clear and moist.   Eyes: Conjunctivae are normal.   Cardiovascular:  Normal rate.           Pulmonary/Chest: Breath sounds normal. No respiratory distress.   Abdominal: Abdomen is soft. She exhibits no distension. There is no abdominal tenderness. There is no guarding.   Musculoskeletal:      Lumbar back: No bony tenderness. Decreased range of motion.     Neurological: She is alert and oriented to person, place, and time. She has normal strength. No sensory deficit.   Skin: Skin is warm. No rash noted. No erythema.         ED Course   Procedures  Labs Reviewed   URINALYSIS, REFLEX TO URINE CULTURE - Normal       Result Value    Color, UA Straw      Appearance, UA Clear      Specific Gravity, UA 1.020      pH, UA 7.5      Protein, UA Negative       Glucose, UA Negative      Ketones, UA Negative      Blood, UA Negative      Bilirubin, UA Negative      Urobilinogen, UA 0.2      Nitrites, UA Negative      Leukocyte Esterase, UA Negative            Imaging Results              X-Ray Lumbar Spine Ap And Lateral (Final result)  Result time 05/04/25 11:56:29      Final result by Jayce Mora MD (05/04/25 11:56:29)                   Narrative:    EXAMINATION  XR LUMBAR SPINE AP AND LATERAL    CLINICAL HISTORY  pain;    TECHNIQUE  A total of 3 images submitted of the lumbosacral spine.    COMPARISON  18 December 2021    FINDINGS  Vertebral segments: There are 5 non rib-bearing lumbar-type vertebral bodies. No cortical displacement, acute compression deformity, or focal lesion. There is similar multilevel degenerative endplate and facet changes through the lumbar column, as well as stable chronic left lateral subluxation of L2 on L3. No evidence of traumatic subluxation. Multilevel intervertebral space narrowing is present, chronic and degenerative in appearance.    Curvature: Normal lordotic curvature is maintained.    Other findings: The partially visualized pelvic joint spaces are congruent and no focal irregularity of the bony pelvis is identified.  No acute or focal soft tissue abnormality.  Scattered vascular calcification again noted.    IMPRESSION  Stable chronic alterations of the lumbar spine, without evidence of acute abnormality.      Electronically signed by: Jayce Mora  Date:    05/04/2025  Time:    11:56                                     Medications   ondansetron disintegrating tablet 4 mg (4 mg Oral Given 5/4/25 1102)   morphine injection 6 mg (6 mg Intramuscular Given 5/4/25 1102)   ondansetron disintegrating tablet 4 mg (4 mg Oral Given 5/4/25 1218)   prochlorperazine injection Soln 5 mg (5 mg Intravenous Given 5/4/25 1303)   0.9% NaCl infusion (0 mLs Intravenous Stopped 5/4/25 1350)   dexAMETHasone injection 8 mg (8 mg Intravenous Given 5/4/25  1359)     Medical Decision Making  Medical Decision Making  Problem list/ differential diagnosis including but not limited to:  Ligament injury, muscle strain, disc herniation, radiculopathy, epidural compression (cauda equina/ conus medullaris/ abscess/ hematoma), fracture, stenosis, myelitis, osteomyelitis, discitis, kidney stone/ infection, obstruction, dissection/aneurysm, perforated ulcer      Patient's chronic illnesses impacting care:  none    Diagnostic test considered but not ordered:    My interpretations:      Radiology reports      Discussion of case with external qualified healthcare professionals:  Not applicable    Review of external notes( inpt, ems, NH, clinic):      Decision rules/scores:    Medications reviewed:  Medications ordered in the ER: zofran, morphine, compazine  Discharge prescriptions: tramadol, zofran, tizanidine, compazine    Social variables possible impacting patient's healthcare:    Code status/discussion    Shared decision making:    Consideration for admission versus discharge: stable for discharge     Amount and/or Complexity of Data Reviewed  Radiology: ordered.    Risk  Prescription drug management.               ED Course as of 05/04/25 1413   Sun May 04, 2025   1340 Patient is still nauseated but not throwing up.  Patient is requesting to go home. [WC]      ED Course User Index  [WC] Alexis Monsivais MD                           Clinical Impression:  Final diagnoses:  [S39.012A] Strain of lumbar region, initial encounter (Primary)          ED Disposition Condition    Discharge Stable          ED Prescriptions       Medication Sig Dispense Start Date End Date Auth. Provider    methocarbamoL (ROBAXIN) 750 MG Tab Take 2 tablets (1,500 mg total) by mouth 3 (three) times daily. for 5 days 30 tablet 5/4/2025 5/9/2025 Alexis Monsivais MD    ondansetron (ZOFRAN) 4 MG tablet Take 1 tablet (4 mg total) by mouth every 6 (six) hours. 12 tablet 5/4/2025 -- Alexis Monsivais MD     prochlorperazine (COMPAZINE) 25 MG suppository Place 1 suppository (25 mg total) rectally every 12 (twelve) hours as needed for Nausea. 10 suppository 5/4/2025 -- Alexis Monsivais MD    traMADoL (ULTRAM) 50 mg tablet Take 1 tablet (50 mg total) by mouth every 6 (six) hours as needed. 12 tablet 5/4/2025 -- Alexis Monsivais MD          Follow-up Information       Follow up With Specialties Details Why Contact Info    Karan Wyman II, MD Internal Medicine   [address] [place] [id number]  [phone]                 [1]   Social History  Tobacco Use    Smoking status: Never    Smokeless tobacco: Never   Substance Use Topics    Alcohol use: Not Currently    Drug use: Never        Alexis Monsivais MD  05/04/25 [id number]

## 2025-05-06 ENCOUNTER — OFFICE VISIT (OUTPATIENT)
Facility: CLINIC | Age: 75
End: 2025-05-06
Payer: MEDICARE

## 2025-05-06 VITALS
HEIGHT: 67 IN | SYSTOLIC BLOOD PRESSURE: 130 MMHG | DIASTOLIC BLOOD PRESSURE: 81 MMHG | HEART RATE: 71 BPM | WEIGHT: 144 LBS | BODY MASS INDEX: 22.6 KG/M2

## 2025-05-06 DIAGNOSIS — M54.9 CHRONIC BACK PAIN GREATER THAN 3 MONTHS DURATION: ICD-10-CM

## 2025-05-06 DIAGNOSIS — G89.29 CHRONIC BACK PAIN GREATER THAN 3 MONTHS DURATION: ICD-10-CM

## 2025-05-06 DIAGNOSIS — M47.816 LUMBAR SPONDYLOSIS: ICD-10-CM

## 2025-05-06 DIAGNOSIS — M51.360 DEGENERATION OF INTERVERTEBRAL DISC OF LUMBAR REGION WITH DISCOGENIC BACK PAIN: Primary | ICD-10-CM

## 2025-05-06 DIAGNOSIS — M54.9 DORSALGIA, UNSPECIFIED: ICD-10-CM

## 2025-05-06 PROCEDURE — 3075F SYST BP GE 130 - 139MM HG: CPT | Mod: CPTII,,, | Performed by: ANESTHESIOLOGY

## 2025-05-06 PROCEDURE — 1125F AMNT PAIN NOTED PAIN PRSNT: CPT | Mod: CPTII,,, | Performed by: ANESTHESIOLOGY

## 2025-05-06 PROCEDURE — 3288F FALL RISK ASSESSMENT DOCD: CPT | Mod: CPTII,,, | Performed by: ANESTHESIOLOGY

## 2025-05-06 PROCEDURE — 1101F PT FALLS ASSESS-DOCD LE1/YR: CPT | Mod: CPTII,,, | Performed by: ANESTHESIOLOGY

## 2025-05-06 PROCEDURE — 1160F RVW MEDS BY RX/DR IN RCRD: CPT | Mod: CPTII,,, | Performed by: ANESTHESIOLOGY

## 2025-05-06 PROCEDURE — 3079F DIAST BP 80-89 MM HG: CPT | Mod: CPTII,,, | Performed by: ANESTHESIOLOGY

## 2025-05-06 PROCEDURE — 99213 OFFICE O/P EST LOW 20 MIN: CPT | Mod: ,,, | Performed by: ANESTHESIOLOGY

## 2025-05-06 PROCEDURE — 1159F MED LIST DOCD IN RCRD: CPT | Mod: CPTII,,, | Performed by: ANESTHESIOLOGY

## 2025-05-06 RX ORDER — ESTRADIOL 0.1 MG/G
CREAM VAGINAL
COMMUNITY
Start: 2025-05-04

## 2025-05-06 RX ORDER — PREGABALIN 50 MG/1
50 CAPSULE ORAL 2 TIMES DAILY
Qty: 60 CAPSULE | Refills: 2 | Status: SHIPPED | OUTPATIENT
Start: 2025-05-06

## 2025-05-06 RX ORDER — TRIAMCINOLONE ACETONIDE 1 MG/G
CREAM TOPICAL 2 TIMES DAILY PRN
COMMUNITY
Start: 2025-02-28

## 2025-05-06 NOTE — PROGRESS NOTES
Reyna Mccarthy MD        PATIENT NAME: Sandra Crowe  : 1950  DATE: 25  MRN: [id number]      Billing Provider: Reyna Mccarthy MD  Level of Service:   Patient PCP Information       Provider PCP Type    Primary Doctor No General            Reason for Visit / Chief Complaint: Back Pain (Pt having increased Lt sided  back pain radiates into Lt leg down to thigh  states back went out on her last Saturday,went into ER last . States was diagnosed w/lumbar strain pt ambulating w/a cane  C/O pain level 9, Taking pain meds from ER)       Update PCP  Update Chief Complaint         History of Present Illness / Problem Focused Workflow     Sandra Crowe presents to the clinic with Back Pain (Pt having increased Lt sided  back pain radiates into Lt leg down to thigh  states back went out on her last Saturday,went into ER last . States was diagnosed w/lumbar strain pt ambulating w/a cane  C/O pain level 9, Taking pain meds from ER)     This is a 75-year-old female who returns to clinic today for follow-up of her chronic low back pain.  She was last seen here almost 3 years ago, at which time she is doing better since undergoing a series of 2 lumbar epidural steroid injections.  She states that she started having increased back pain last Friday radiating into the left thigh.  Her pain got worse on Saturday, and by  she ended up going to the emergency department.  She denies any injuries that aggravated her pain.  She has been using a cane to ambulate since  to help take some of the pressure off of her left leg, but she denies any weakness.      Back Pain        Review of Systems     Review of Systems   Musculoskeletal:  Positive for back pain.   All other systems reviewed and are negative.       Medical / Social / Family History     Past Medical History:   Diagnosis Date    Personal history of colonic polyps     Unspecified glaucoma     Vitamin D deficiency        Past Surgical History:    Procedure Laterality Date    CATARACT EXTRACTION      COLONOSCOPY  11/27/2023    COLONOSCOPY W/ POLYPECTOMY  11/11/2016    DESTRUCTION, CILIARY BODY, USING LASER Right 6/11/2024    Procedure: IRIDEX - OD;  Surgeon: Elodia Elizondo MD;  Location: [facility] OR;  Service: Ophthalmology;  Laterality: Right;    DESTRUCTION, CILIARY BODY, USING LASER Left 7/9/2024    Procedure: IRIDEX 4/15 - OS;  Surgeon: Elodia Elizondo MD;  Location: [facility] OR;  Service: Ophthalmology;  Laterality: Left;    EYE SURGERY  2015    FRACTURE SURGERY  1956    HYSTERECTOMY  2001    PARTIAL HYSTERECTOMY  08/04/2001    TRANSFORAMINAL EPIDURAL INJECTION OF STEROID Left 02/16/2022    TRANSFORAMINAL EPIDURAL INJECTION OF STEROID Left 01/19/2022       Social History  Ms. Crowe  reports that she has never smoked. She has never used smokeless tobacco. She reports that she does not currently use alcohol. She reports that she does not use drugs.    Family History  Ms.'s Crowe family history includes Coronary artery disease in her father; Glaucoma in her mother.    Medications and Allergies     Medications  Outpatient Medications Marked as Taking for the 5/6/25 encounter (Office Visit) with Reyna Mccarthy MD   Medication Sig Dispense Refill    albuterol (PROVENTIL HFA) 90 mcg/actuation inhaler Inhale 2 puffs into the lungs every 6 (six) hours as needed for Wheezing. Rescue 18 g 1    aspirin (ECOTRIN) 81 MG EC tablet Take 81 mg by mouth once daily.      azelastine (ASTELIN) 137 mcg (0.1 %) nasal spray 1 spray (137 mcg total) by Nasal route 2 (two) times daily. 30 mL 11    brinzolamide (AZOPT) 1 % ophthalmic suspension Place 1 drop into both eyes 3 (three) times daily.      CEQUA 0.09 % Dpet Place 1 drop into both eyes 2 (two) times daily.      estradioL (ESTRACE) 0.01 % (0.1 mg/gram) vaginal cream Place vaginally.      latanoprost 0.005 % ophthalmic solution Place 1 drop into both eyes every evening.      methocarbamoL (ROBAXIN) 750 MG Tab Take 2  "tablets (1,500 mg total) by mouth 3 (three) times daily. for 5 days 30 tablet 0    ondansetron (ZOFRAN) 4 MG tablet Take 1 tablet (4 mg total) by mouth every 6 (six) hours. 12 tablet 0    prochlorperazine (COMPAZINE) 25 MG suppository Place 1 suppository (25 mg total) rectally every 12 (twelve) hours as needed for Nausea. 10 suppository 0    traMADoL (ULTRAM) 50 mg tablet Take 1 tablet (50 mg total) by mouth every 6 (six) hours as needed. 12 tablet 0    triamcinolone acetonide 0.1% (KENALOG) 0.1 % cream Apply topically 2 (two) times daily as needed.      triamterene-hydrochlorothiazide 37.5-25 mg (MAXZIDE-25) 37.5-25 mg per tablet Take 1 tablet by mouth once daily. 90 tablet 3       Allergies  Review of patient's allergies indicates:   Allergen Reactions    Adhesive Other (See Comments)     "Tears my skin"    Brimonidine tartrate (bulk) Other (See Comments)    Codeine      Other reaction(s): Nauseated    Ivp dye [iodinated contrast media] Hives    Nsaids (non-steroidal anti-inflammatory drug)      Other reaction(s): Swelling    Opioids - morphine analogues Nausea Only       Physical Examination     Vitals:    05/06/25 0901   BP: 130/81   Pulse: 71     Spine Musculoskeletal Exam    Gait    Assistive device: cane    Inspection    Thoracolumbar    Thoracolumbar inspection is normal.    Range of Motion    Thoracolumbar        Thoracolumbar range of motion additional comments: Limited range of motion in the lumbar spine due to pain.    Strength    Thoracolumbar    Thoracolumbar motor exam is normal.       Sensory    Thoracolumbar    Thoracolumbar sensation is normal.    General      Constitutional: appears stated age, well-developed and well-nourished    Scleral icterus: no    Labored breathing: no    Psychiatric: normal mood and affect and no acute distress    Neurological: alert and oriented x3    Skin: intact       Assessment and Plan (including Health Maintenance)      Problem List  Smart Sets  Document Outside HM "   :    Plan:   Degeneration of intervertebral disc of lumbar region with discogenic back pain  -     MRI Lumbar Spine Without Contrast; Future; Expected date: 05/06/2025  -     pregabalin (LYRICA) 50 MG capsule; Take 1 capsule (50 mg total) by mouth 2 (two) times daily.  Dispense: 60 capsule; Refill: 2    Lumbar spondylosis  -     MRI Lumbar Spine Without Contrast; Future; Expected date: 05/06/2025  -     pregabalin (LYRICA) 50 MG capsule; Take 1 capsule (50 mg total) by mouth 2 (two) times daily.  Dispense: 60 capsule; Refill: 2    Chronic back pain greater than 3 months duration  -     MRI Lumbar Spine Without Contrast; Future; Expected date: 05/06/2025  -     pregabalin (LYRICA) 50 MG capsule; Take 1 capsule (50 mg total) by mouth 2 (two) times daily.  Dispense: 60 capsule; Refill: 2    Dorsalgia, unspecified  -     MRI Lumbar Spine Without Contrast; Future; Expected date: 05/06/2025  -     pregabalin (LYRICA) 50 MG capsule; Take 1 capsule (50 mg total) by mouth 2 (two) times daily.  Dispense: 60 capsule; Refill: 2           Problem List Items Addressed This Visit          Neuro    Lumbar degenerative disc disease - Primary    Relevant Medications    pregabalin (LYRICA) 50 MG capsule    Other Relevant Orders    MRI Lumbar Spine Without Contrast    Lumbar spondylosis    Relevant Medications    pregabalin (LYRICA) 50 MG capsule    Other Relevant Orders    MRI Lumbar Spine Without Contrast       Orthopedic    Chronic back pain greater than 3 months duration    Relevant Medications    pregabalin (LYRICA) 50 MG capsule    Other Relevant Orders    MRI Lumbar Spine Without Contrast     Other Visit Diagnoses         Dorsalgia, unspecified        Relevant Medications    pregabalin (LYRICA) 50 MG capsule    Other Relevant Orders    MRI Lumbar Spine Without Contrast              Future Appointments   Date Time Provider Department Center   11/21/2025  9:45 AM Karan Wyman II, MD [facility] [id number]        There  are no Patient Instructions on file for this visit.  No follow-ups on file.     Signature:  Reyna Mccarthy MD      Date of encounter: 5/6/25

## 2025-05-15 ENCOUNTER — OFFICE VISIT (OUTPATIENT)
Facility: CLINIC | Age: 75
End: 2025-05-15
Payer: MEDICARE

## 2025-05-15 VITALS
HEIGHT: 67 IN | HEART RATE: 66 BPM | BODY MASS INDEX: 22.13 KG/M2 | DIASTOLIC BLOOD PRESSURE: 81 MMHG | WEIGHT: 141 LBS | SYSTOLIC BLOOD PRESSURE: 136 MMHG

## 2025-05-15 DIAGNOSIS — M51.16 INTERVERTEBRAL DISC DISORDERS WITH RADICULOPATHY, LUMBAR REGION: ICD-10-CM

## 2025-05-15 DIAGNOSIS — M47.816 LUMBAR SPONDYLOSIS: ICD-10-CM

## 2025-05-15 DIAGNOSIS — M54.9 CHRONIC BACK PAIN GREATER THAN 3 MONTHS DURATION: ICD-10-CM

## 2025-05-15 DIAGNOSIS — M51.360 DEGENERATION OF INTERVERTEBRAL DISC OF LUMBAR REGION WITH DISCOGENIC BACK PAIN: Primary | ICD-10-CM

## 2025-05-15 DIAGNOSIS — G89.29 CHRONIC BACK PAIN GREATER THAN 3 MONTHS DURATION: ICD-10-CM

## 2025-05-15 RX ORDER — CYCLOBENZAPRINE HCL 5 MG
5 TABLET ORAL 3 TIMES DAILY PRN
Qty: 50 TABLET | Refills: 0 | Status: SHIPPED | OUTPATIENT
Start: 2025-05-15

## 2025-05-15 NOTE — H&P (VIEW-ONLY)
Reyna Mccarthy MD        PATIENT NAME: Sandra Crowe  : 1950  DATE: 5/15/25  MRN: [id number]      Billing Provider: Reyna Mccarthy MD  Level of Service:   Patient PCP Information       Provider PCP Type    Primary Doctor No General            Reason for Visit / Chief Complaint: Results (MRI results  MRI in imaging C/O pain level 6,Taking tylenol for pain)       Update PCP  Update Chief Complaint         History of Present Illness / Problem Focused Workflow     Sandra Crowe presents to the clinic with Results (MRI results  MRI in imaging C/O pain level 6,Taking tylenol for pain)     This is a 75-year-old female who returns to clinic today for follow-up of her chronic low back pain.  She was last seen here almost 3 years ago, at which time she is doing better since undergoing a series of 2 lumbar epidural steroid injections.  She states that she started having increased back pain last Friday radiating into the left thigh.  Her pain got worse on Saturday, and by  she ended up going to the emergency department.  She denies any injuries that aggravated her pain.  She has been using a cane to ambulate since  to help take some of the pressure off of her left leg, but she denies any weakness.  I ordered an updated MRI of her lumbar spine at appointment here last week and she is back today to discuss the results.      Back Pain        Review of Systems     Review of Systems   Musculoskeletal:  Positive for back pain.   All other systems reviewed and are negative.       Medical / Social / Family History     Past Medical History:   Diagnosis Date    Personal history of colonic polyps     Unspecified glaucoma     Vitamin D deficiency        Past Surgical History:   Procedure Laterality Date    CATARACT EXTRACTION      COLONOSCOPY  2023    COLONOSCOPY W/ POLYPECTOMY  2016    DESTRUCTION, CILIARY BODY, USING LASER Right 2024    Procedure: IRIDEX - OD;  Surgeon: Elodia Elizondo MD;   Location: [facility] OR;  Service: Ophthalmology;  Laterality: Right;    DESTRUCTION, CILIARY BODY, USING LASER Left 7/9/2024    Procedure: IRIDEX 4/15 - OS;  Surgeon: Elodia Elizondo MD;  Location: [facility] OR;  Service: Ophthalmology;  Laterality: Left;    EYE SURGERY  2015    FRACTURE SURGERY  1956    HYSTERECTOMY  2001    PARTIAL HYSTERECTOMY  08/04/2001    TRANSFORAMINAL EPIDURAL INJECTION OF STEROID Left 02/16/2022    TRANSFORAMINAL EPIDURAL INJECTION OF STEROID Left 01/19/2022       Social History  Ms. Crowe  reports that she has never smoked. She has never used smokeless tobacco. She reports that she does not currently use alcohol. She reports that she does not use drugs.    Family History  Ms.'s Crowe family history includes Coronary artery disease in her father; Glaucoma in her mother.    Medications and Allergies     Medications  Outpatient Medications Marked as Taking for the 5/15/25 encounter (Office Visit) with Reyna Mccarthy MD   Medication Sig Dispense Refill    albuterol (PROVENTIL HFA) 90 mcg/actuation inhaler Inhale 2 puffs into the lungs every 6 (six) hours as needed for Wheezing. Rescue 18 g 1    aspirin (ECOTRIN) 81 MG EC tablet Take 81 mg by mouth once daily.      azelastine (ASTELIN) 137 mcg (0.1 %) nasal spray 1 spray (137 mcg total) by Nasal route 2 (two) times daily. 30 mL 11    brinzolamide (AZOPT) 1 % ophthalmic suspension Place 1 drop into both eyes 3 (three) times daily.      CEQUA 0.09 % Dpet Place 1 drop into both eyes 2 (two) times daily.      estradioL (ESTRACE) 0.01 % (0.1 mg/gram) vaginal cream Place vaginally.      latanoprost 0.005 % ophthalmic solution Place 1 drop into both eyes every evening.      ondansetron (ZOFRAN) 4 MG tablet Take 1 tablet (4 mg total) by mouth every 6 (six) hours. 12 tablet 0    pregabalin (LYRICA) 50 MG capsule Take 1 capsule (50 mg total) by mouth 2 (two) times daily. 60 capsule 2    prochlorperazine (COMPAZINE) 25 MG suppository Place 1 suppository (25  "mg total) rectally every 12 (twelve) hours as needed for Nausea. 10 suppository 0    traMADoL (ULTRAM) 50 mg tablet Take 1 tablet (50 mg total) by mouth every 6 (six) hours as needed. 12 tablet 0    triamcinolone acetonide 0.1% (KENALOG) 0.1 % cream Apply topically 2 (two) times daily as needed.      triamterene-hydrochlorothiazide 37.5-25 mg (MAXZIDE-25) 37.5-25 mg per tablet Take 1 tablet by mouth once daily. 90 tablet 3       Allergies  Review of patient's allergies indicates:   Allergen Reactions    Adhesive Other (See Comments)     "Tears my skin"    Brimonidine tartrate (bulk) Other (See Comments)    Codeine      Other reaction(s): Nauseated    Ivp dye [iodinated contrast media] Hives    Nsaids (non-steroidal anti-inflammatory drug)      Other reaction(s): Swelling    Opioids - morphine analogues Nausea Only       Physical Examination     Vitals:    05/15/25 0948   BP: 136/81   Pulse: 66     Spine Musculoskeletal Exam    Gait    Assistive device: cane    Inspection    Thoracolumbar    Thoracolumbar inspection is normal.    Range of Motion    Thoracolumbar        Thoracolumbar range of motion additional comments: Limited range of motion in the lumbar spine due to pain.    Strength    Thoracolumbar    Thoracolumbar motor exam is normal.       Sensory    Thoracolumbar    Thoracolumbar sensation is normal.    General      Constitutional: appears stated age, well-developed and well-nourished    Scleral icterus: no    Labored breathing: no    Psychiatric: normal mood and affect and no acute distress    Neurological: alert and oriented x3    Skin: intact       Assessment and Plan (including Health Maintenance)      Problem List  Smart Sets  Document Outside HM   :    Plan:   Degeneration of intervertebral disc of lumbar region with discogenic back pain  -     cyclobenzaprine (FLEXERIL) 5 MG tablet; Take 1 tablet (5 mg total) by mouth 3 (three) times daily as needed for Muscle spasms.  Dispense: 50 tablet; Refill: " 0    Lumbar spondylosis  -     cyclobenzaprine (FLEXERIL) 5 MG tablet; Take 1 tablet (5 mg total) by mouth 3 (three) times daily as needed for Muscle spasms.  Dispense: 50 tablet; Refill: 0    Chronic back pain greater than 3 months duration  -     cyclobenzaprine (FLEXERIL) 5 MG tablet; Take 1 tablet (5 mg total) by mouth 3 (three) times daily as needed for Muscle spasms.  Dispense: 50 tablet; Refill: 0       She is being scheduled for left L3 and L4 transforaminal epidural steroid injections today to help with her low back pain radiating into the left lower extremity, consistent with findings of degenerative disc disease seen on her MRI.  The plan was discussed with the patient and she wishes to proceed.    Problem List Items Addressed This Visit          Neuro    Lumbar degenerative disc disease - Primary    Relevant Medications    cyclobenzaprine (FLEXERIL) 5 MG tablet    Lumbar spondylosis    Relevant Medications    cyclobenzaprine (FLEXERIL) 5 MG tablet       Orthopedic    Chronic back pain greater than 3 months duration    Relevant Medications    cyclobenzaprine (FLEXERIL) 5 MG tablet         Future Appointments   Date Time Provider Department Center   11/21/2025  9:45 AM Karan Wyman II, MD [facility] [id number]        There are no Patient Instructions on file for this visit.  No follow-ups on file.     Signature:  Reyna Mccarthy MD      Date of encounter: 5/15/25

## 2025-05-15 NOTE — PROGRESS NOTES
Reyna Mccarthy MD        PATIENT NAME: Sandra Crowe  : 1950  DATE: 5/15/25  MRN: [id number]      Billing Provider: Reyna Mccarthy MD  Level of Service:   Patient PCP Information       Provider PCP Type    Primary Doctor No General            Reason for Visit / Chief Complaint: Results (MRI results  MRI in imaging C/O pain level 6,Taking tylenol for pain)       Update PCP  Update Chief Complaint         History of Present Illness / Problem Focused Workflow     Sandra Crowe presents to the clinic with Results (MRI results  MRI in imaging C/O pain level 6,Taking tylenol for pain)     This is a 75-year-old female who returns to clinic today for follow-up of her chronic low back pain.  She was last seen here almost 3 years ago, at which time she is doing better since undergoing a series of 2 lumbar epidural steroid injections.  She states that she started having increased back pain last Friday radiating into the left thigh.  Her pain got worse on Saturday, and by  she ended up going to the emergency department.  She denies any injuries that aggravated her pain.  She has been using a cane to ambulate since  to help take some of the pressure off of her left leg, but she denies any weakness.  I ordered an updated MRI of her lumbar spine at appointment here last week and she is back today to discuss the results.      Back Pain        Review of Systems     Review of Systems   Musculoskeletal:  Positive for back pain.   All other systems reviewed and are negative.       Medical / Social / Family History     Past Medical History:   Diagnosis Date    Personal history of colonic polyps     Unspecified glaucoma     Vitamin D deficiency        Past Surgical History:   Procedure Laterality Date    CATARACT EXTRACTION      COLONOSCOPY  2023    COLONOSCOPY W/ POLYPECTOMY  2016    DESTRUCTION, CILIARY BODY, USING LASER Right 2024    Procedure: IRIDEX - OD;  Surgeon: Elodia Elizondo MD;   Location: [facility] OR;  Service: Ophthalmology;  Laterality: Right;    DESTRUCTION, CILIARY BODY, USING LASER Left 7/9/2024    Procedure: IRIDEX 4/15 - OS;  Surgeon: Elodia Elizondo MD;  Location: [facility] OR;  Service: Ophthalmology;  Laterality: Left;    EYE SURGERY  2015    FRACTURE SURGERY  1956    HYSTERECTOMY  2001    PARTIAL HYSTERECTOMY  08/04/2001    TRANSFORAMINAL EPIDURAL INJECTION OF STEROID Left 02/16/2022    TRANSFORAMINAL EPIDURAL INJECTION OF STEROID Left 01/19/2022       Social History  Ms. Crowe  reports that she has never smoked. She has never used smokeless tobacco. She reports that she does not currently use alcohol. She reports that she does not use drugs.    Family History  Ms.'s Crowe family history includes Coronary artery disease in her father; Glaucoma in her mother.    Medications and Allergies     Medications  Outpatient Medications Marked as Taking for the 5/15/25 encounter (Office Visit) with Reyna Mccarthy MD   Medication Sig Dispense Refill    albuterol (PROVENTIL HFA) 90 mcg/actuation inhaler Inhale 2 puffs into the lungs every 6 (six) hours as needed for Wheezing. Rescue 18 g 1    aspirin (ECOTRIN) 81 MG EC tablet Take 81 mg by mouth once daily.      azelastine (ASTELIN) 137 mcg (0.1 %) nasal spray 1 spray (137 mcg total) by Nasal route 2 (two) times daily. 30 mL 11    brinzolamide (AZOPT) 1 % ophthalmic suspension Place 1 drop into both eyes 3 (three) times daily.      CEQUA 0.09 % Dpet Place 1 drop into both eyes 2 (two) times daily.      estradioL (ESTRACE) 0.01 % (0.1 mg/gram) vaginal cream Place vaginally.      latanoprost 0.005 % ophthalmic solution Place 1 drop into both eyes every evening.      ondansetron (ZOFRAN) 4 MG tablet Take 1 tablet (4 mg total) by mouth every 6 (six) hours. 12 tablet 0    pregabalin (LYRICA) 50 MG capsule Take 1 capsule (50 mg total) by mouth 2 (two) times daily. 60 capsule 2    prochlorperazine (COMPAZINE) 25 MG suppository Place 1 suppository (25  "mg total) rectally every 12 (twelve) hours as needed for Nausea. 10 suppository 0    traMADoL (ULTRAM) 50 mg tablet Take 1 tablet (50 mg total) by mouth every 6 (six) hours as needed. 12 tablet 0    triamcinolone acetonide 0.1% (KENALOG) 0.1 % cream Apply topically 2 (two) times daily as needed.      triamterene-hydrochlorothiazide 37.5-25 mg (MAXZIDE-25) 37.5-25 mg per tablet Take 1 tablet by mouth once daily. 90 tablet 3       Allergies  Review of patient's allergies indicates:   Allergen Reactions    Adhesive Other (See Comments)     "Tears my skin"    Brimonidine tartrate (bulk) Other (See Comments)    Codeine      Other reaction(s): Nauseated    Ivp dye [iodinated contrast media] Hives    Nsaids (non-steroidal anti-inflammatory drug)      Other reaction(s): Swelling    Opioids - morphine analogues Nausea Only       Physical Examination     Vitals:    05/15/25 0948   BP: 136/81   Pulse: 66     Spine Musculoskeletal Exam    Gait    Assistive device: cane    Inspection    Thoracolumbar    Thoracolumbar inspection is normal.    Range of Motion    Thoracolumbar        Thoracolumbar range of motion additional comments: Limited range of motion in the lumbar spine due to pain.    Strength    Thoracolumbar    Thoracolumbar motor exam is normal.       Sensory    Thoracolumbar    Thoracolumbar sensation is normal.    General      Constitutional: appears stated age, well-developed and well-nourished    Scleral icterus: no    Labored breathing: no    Psychiatric: normal mood and affect and no acute distress    Neurological: alert and oriented x3    Skin: intact       Assessment and Plan (including Health Maintenance)      Problem List  Smart Sets  Document Outside HM   :    Plan:   Degeneration of intervertebral disc of lumbar region with discogenic back pain  -     cyclobenzaprine (FLEXERIL) 5 MG tablet; Take 1 tablet (5 mg total) by mouth 3 (three) times daily as needed for Muscle spasms.  Dispense: 50 tablet; Refill: " 0    Lumbar spondylosis  -     cyclobenzaprine (FLEXERIL) 5 MG tablet; Take 1 tablet (5 mg total) by mouth 3 (three) times daily as needed for Muscle spasms.  Dispense: 50 tablet; Refill: 0    Chronic back pain greater than 3 months duration  -     cyclobenzaprine (FLEXERIL) 5 MG tablet; Take 1 tablet (5 mg total) by mouth 3 (three) times daily as needed for Muscle spasms.  Dispense: 50 tablet; Refill: 0       She is being scheduled for left L3 and L4 transforaminal epidural steroid injections today to help with her low back pain radiating into the left lower extremity, consistent with findings of degenerative disc disease seen on her MRI.  The plan was discussed with the patient and she wishes to proceed.    Problem List Items Addressed This Visit          Neuro    Lumbar degenerative disc disease - Primary    Relevant Medications    cyclobenzaprine (FLEXERIL) 5 MG tablet    Lumbar spondylosis    Relevant Medications    cyclobenzaprine (FLEXERIL) 5 MG tablet       Orthopedic    Chronic back pain greater than 3 months duration    Relevant Medications    cyclobenzaprine (FLEXERIL) 5 MG tablet         Future Appointments   Date Time Provider Department Center   11/21/2025  9:45 AM Karan Wyman II, MD [facility] [id number]        There are no Patient Instructions on file for this visit.  No follow-ups on file.     Signature:  Reyna Mccarthy MD      Date of encounter: 5/15/25

## 2025-05-26 ENCOUNTER — TELEPHONE (OUTPATIENT)
Facility: CLINIC | Age: 75
End: 2025-05-26
Payer: MEDICARE

## 2025-05-26 NOTE — TELEPHONE ENCOUNTER
Per your request, I spoke with Patient's insurance company and was advised that Baclofen and Tizanidine  are approved medications on their formulary.

## 2025-06-09 ENCOUNTER — TELEPHONE (OUTPATIENT)
Facility: CLINIC | Age: 75
End: 2025-06-09
Payer: MEDICARE

## 2025-06-09 NOTE — TELEPHONE ENCOUNTER
Pt called the office stating she was doing great on medications that Dr Mccarthy prescribed to her up until this past Saturday ,states pain in lower back & Lt front quad started and progressed throughout the day feels like a burning/grabbing pain, states hurts to stand up using a cane due to pain severity. Please advise.

## 2025-06-11 ENCOUNTER — HOSPITAL ENCOUNTER (OUTPATIENT)
Facility: HOSPITAL | Age: 75
Discharge: HOME | End: 2025-06-11
Attending: ANESTHESIOLOGY | Admitting: ANESTHESIOLOGY
Payer: MEDICARE

## 2025-06-11 DIAGNOSIS — M54.9 CHRONIC BACK PAIN GREATER THAN 3 MONTHS DURATION: Primary | ICD-10-CM

## 2025-06-11 DIAGNOSIS — G89.29 CHRONIC BACK PAIN GREATER THAN 3 MONTHS DURATION: Primary | ICD-10-CM

## 2025-06-11 PROCEDURE — 64483 NJX AA&/STRD TFRM EPI L/S 1: CPT | Mod: LT | Performed by: ANESTHESIOLOGY

## 2025-06-11 PROCEDURE — 63600175 PHARM REV CODE 636 W HCPCS: Performed by: ANESTHESIOLOGY

## 2025-06-11 PROCEDURE — 25000003 PHARM REV CODE 250: Performed by: ANESTHESIOLOGY

## 2025-06-11 PROCEDURE — 64483 NJX AA&/STRD TFRM EPI L/S 1: CPT | Mod: LT,,, | Performed by: ANESTHESIOLOGY

## 2025-06-11 RX ORDER — DEXAMETHASONE SODIUM PHOSPHATE 10 MG/ML
INJECTION, SOLUTION INTRA-ARTICULAR; INTRALESIONAL; INTRAMUSCULAR; INTRAVENOUS; SOFT TISSUE
Status: DISCONTINUED | OUTPATIENT
Start: 2025-06-11 | End: 2025-06-11 | Stop reason: HOSPADM

## 2025-06-11 RX ORDER — BUPIVACAINE HYDROCHLORIDE 2.5 MG/ML
INJECTION, SOLUTION EPIDURAL; INFILTRATION; INTRACAUDAL; PERINEURAL
Status: DISPENSED
Start: 2025-06-11 | End: 2025-06-11

## 2025-06-11 RX ORDER — DEXAMETHASONE SODIUM PHOSPHATE 10 MG/ML
INJECTION, SOLUTION INTRA-ARTICULAR; INTRALESIONAL; INTRAMUSCULAR; INTRAVENOUS; SOFT TISSUE
Status: DISPENSED
Start: 2025-06-11 | End: 2025-06-11

## 2025-06-11 RX ORDER — LIDOCAINE HYDROCHLORIDE 10 MG/ML
INJECTION, SOLUTION EPIDURAL; INFILTRATION; INTRACAUDAL; PERINEURAL
Status: DISCONTINUED | OUTPATIENT
Start: 2025-06-11 | End: 2025-06-11 | Stop reason: HOSPADM

## 2025-06-11 RX ORDER — BUPIVACAINE HYDROCHLORIDE 2.5 MG/ML
INJECTION, SOLUTION EPIDURAL; INFILTRATION; INTRACAUDAL; PERINEURAL
Status: DISCONTINUED | OUTPATIENT
Start: 2025-06-11 | End: 2025-06-11 | Stop reason: HOSPADM

## 2025-06-11 RX ORDER — DIAZEPAM 5 MG/1
10 TABLET ORAL
Status: DISCONTINUED | OUTPATIENT
Start: 2025-06-11 | End: 2025-06-14 | Stop reason: HOSPADM

## 2025-06-11 RX ORDER — LIDOCAINE HYDROCHLORIDE 10 MG/ML
INJECTION, SOLUTION EPIDURAL; INFILTRATION; INTRACAUDAL; PERINEURAL
Status: DISPENSED
Start: 2025-06-11 | End: 2025-06-11

## 2025-06-11 RX ORDER — CETIRIZINE HYDROCHLORIDE 10 MG/1
10 TABLET ORAL DAILY
COMMUNITY

## 2025-06-11 RX ADMIN — DIAZEPAM 10 MG: 5 TABLET ORAL at 10:06

## 2025-06-11 NOTE — OP NOTE
Procedure:    Left L3 transforaminal epidural steroid injection    Left L4 transforaminal epidural steroid injection    Pre-Procedure Diagnoses:  Chronic back pain greater than 3 months  Lumbar degenerative disc disease  Lumbar radiculopathy  Lumbar disc displacement    Post-Procedure Diagnoses:  Chronic back pain greater than 3 months  Lumbar degenerative disc disease  Lumbar radiculopathy  Lumbar disc displacement    Anesthesia:  Local    Estimated Blood Loss:  < 2 ML    Consent:  The procedure, risks, benefits, and alternatives were discussed with the patient.  The patient voiced understanding and fully informed written consent was obtained.    Description of the Procedure:  The patient was taken to the operating room and placed in the prone position. The skin overlying the lumbar spine was prepped with Chloraprep and draped in the usual sterile fashion.  An oblique fluoroscopic view was obtained on the left side at L3, with the superior articular process of the inferior vertebral body aligned with the pedicle.  Skin anesthesia was achieved using 2 mL of lidocaine 1%.  A 22-gauge 3.5 -inch Quinke spinal needle was inserted and advanced under intermittent fluoroscopic views into the epidural space. Proper needle position was confirmed under AP, oblique, and lateral fluoroscopic views.  Negative aspiration for blood or CSF was confirmed. 1 mL of contrast was injected, which revealed spread into the epidural space.  Then a combination of 5 mg of dexamethasone with 1 mL of 0.25% bupivacaine was easily injected.   There was no pain on injection. The needle was removed intact and bleeding was nil.  The same procedure was repeated in identical fashion on the left side at L4 .  Sterile bandages were applied. The patient was taken to the recovery room for further observation in stable condition. The patient was then discharged home with no complications.

## 2025-06-11 NOTE — DISCHARGE SUMMARY
[facility] - Periop Services  Discharge Note  Short Stay    Procedure(s) (LRB):  INJECTION, SPINE, LUMBOSACRAL, TRANSFORAMINAL APPROACH / TFESI Left L3 & L4 (Left)      OUTCOME: Patient tolerated treatment/procedure well without complication and is now ready for discharge.    DISPOSITION: Home or Self Care    FINAL DIAGNOSIS:  <principal problem not specified>    FOLLOWUP: In clinic    DISCHARGE INSTRUCTIONS:  No discharge procedures on file.     TIME SPENT ON DISCHARGE: 5 minutes

## 2025-06-12 VITALS
SYSTOLIC BLOOD PRESSURE: 154 MMHG | HEIGHT: 67 IN | DIASTOLIC BLOOD PRESSURE: 90 MMHG | OXYGEN SATURATION: 100 % | WEIGHT: 141 LBS | HEART RATE: 75 BPM | TEMPERATURE: 98 F | RESPIRATION RATE: 18 BRPM | BODY MASS INDEX: 22.13 KG/M2

## 2025-06-26 ENCOUNTER — HOSPITAL ENCOUNTER (OUTPATIENT)
Dept: RADIOLOGY | Facility: HOSPITAL | Age: 75
Discharge: HOME | End: 2025-06-26
Attending: ANESTHESIOLOGY
Payer: MEDICARE

## 2025-06-26 ENCOUNTER — OFFICE VISIT (OUTPATIENT)
Facility: CLINIC | Age: 75
End: 2025-06-26
Payer: MEDICARE

## 2025-06-26 VITALS
DIASTOLIC BLOOD PRESSURE: 85 MMHG | HEART RATE: 76 BPM | SYSTOLIC BLOOD PRESSURE: 132 MMHG | HEIGHT: 67 IN | WEIGHT: 138 LBS | BODY MASS INDEX: 21.66 KG/M2

## 2025-06-26 DIAGNOSIS — M25.552 LEFT HIP PAIN: ICD-10-CM

## 2025-06-26 DIAGNOSIS — M51.360 DEGENERATION OF INTERVERTEBRAL DISC OF LUMBAR REGION WITH DISCOGENIC BACK PAIN: Primary | ICD-10-CM

## 2025-06-26 DIAGNOSIS — M54.9 CHRONIC BACK PAIN GREATER THAN 3 MONTHS DURATION: ICD-10-CM

## 2025-06-26 DIAGNOSIS — M47.816 LUMBAR SPONDYLOSIS: ICD-10-CM

## 2025-06-26 DIAGNOSIS — G89.29 CHRONIC BACK PAIN GREATER THAN 3 MONTHS DURATION: ICD-10-CM

## 2025-06-26 DIAGNOSIS — M54.9 DORSALGIA, UNSPECIFIED: ICD-10-CM

## 2025-06-26 PROCEDURE — 73502 X-RAY EXAM HIP UNI 2-3 VIEWS: CPT | Mod: TC,LT

## 2025-06-26 RX ORDER — PREGABALIN 75 MG/1
75 CAPSULE ORAL 2 TIMES DAILY
Qty: 60 CAPSULE | Refills: 2 | Status: SHIPPED | OUTPATIENT
Start: 2025-06-26

## 2025-06-26 NOTE — PROGRESS NOTES
Reyna Mccarthy MD        PATIENT NAME: Sandra Crowe  : 1950  DATE: 25  MRN: [id number]      Billing Provider: Reyna Mccarthy MD  Level of Service:   Patient PCP Information       Provider PCP Type    Primary Doctor No General            Reason for Visit / Chief Complaint: Post-op Evaluation (Post op TFESI Lt L3 & L4 25 C/O pain level 6, states procedure helped a lot Taking Lyrica for pain & tylenol)       Update PCP  Update Chief Complaint         History of Present Illness / Problem Focused Workflow     Sandra Crowe presents to the clinic with Post-op Evaluation (Post op TFESI Lt L3 & L4 25 C/O pain level 6, states procedure helped a lot Taking Lyrica for pain & tylenol)     This is a 75-year-old female who returns to clinic today for follow-up of her chronic low back pain.  She started having increased back pain a few weeks ago radiating into the left thigh.  She has been using a cane to ambulate since  to help take some of the pressure off of her left leg, but she denies any weakness.  She underwent left L3 and L4 transforaminal epidural steroid injections a couple of weeks ago.  She reports 70% improvement in her pain as a result of that procedure.  However, she continues to complain of pain in the left lower back radiating into the left groin.  She currently rates the pain as 6/10 on the NRS.      Back Pain        Review of Systems     Review of Systems   Musculoskeletal:  Positive for back pain.   All other systems reviewed and are negative.       Medical / Social / Family History     Past Medical History:   Diagnosis Date    Hypertension     Personal history of colonic polyps     Unspecified glaucoma     Vitamin D deficiency        Past Surgical History:   Procedure Laterality Date    CATARACT EXTRACTION      COLONOSCOPY  2023    COLONOSCOPY W/ POLYPECTOMY  2016    DESTRUCTION, CILIARY BODY, USING LASER Right 2024    Procedure: IRIDEX - OD;  Surgeon:  Elodia Elizondo MD;  Location: [facility] OR;  Service: Ophthalmology;  Laterality: Right;    DESTRUCTION, CILIARY BODY, USING LASER Left 07/09/2024    Procedure: IRIDEX 4/15 - OS;  Surgeon: Elodia Elizondo MD;  Location: [facility] OR;  Service: Ophthalmology;  Laterality: Left;    EXCISION OF THYROGLOSSAL DUCT CYST      EYE SURGERY  2015    FRACTURE SURGERY Right 1956    radius and ulnar    HYSTERECTOMY  2001    INJECTION, SPINE, LUMBOSACRAL, TRANSFORAMINAL APPROACH Left 6/11/2025    Procedure: INJECTION, SPINE, LUMBOSACRAL, TRANSFORAMINAL APPROACH / TFESI Left L3 & L4;  Surgeon: Reyna Mccarthy MD;  Location: [facility] OR;  Service: Pain Management;  Laterality: Left;  TFESI Left L3 & L4    PARTIAL HYSTERECTOMY  08/04/2001    TRANSFORAMINAL EPIDURAL INJECTION OF STEROID Left 02/16/2022    TRANSFORAMINAL EPIDURAL INJECTION OF STEROID Left 01/19/2022       Social History  Ms. Crowe  reports that she has never smoked. She has never used smokeless tobacco. She reports that she does not currently use alcohol. She reports that she does not use drugs.    Family History  Ms.'s Crowe family history includes Coronary artery disease in her father; Glaucoma in her mother.    Medications and Allergies     Medications  Outpatient Medications Marked as Taking for the 6/26/25 encounter (Office Visit) with Reyna Mccarthy MD   Medication Sig Dispense Refill    aspirin (ECOTRIN) 81 MG EC tablet Take 81 mg by mouth once daily.      azelastine (ASTELIN) 137 mcg (0.1 %) nasal spray 1 spray (137 mcg total) by Nasal route 2 (two) times daily. 30 mL 11    brinzolamide (AZOPT) 1 % ophthalmic suspension Place 1 drop into both eyes 3 (three) times daily.      CEQUA 0.09 % Dpet Place 1 drop into both eyes 2 (two) times daily.      cetirizine (ZYRTEC) 10 MG tablet Take 10 mg by mouth once daily.      estradioL (ESTRACE) 0.01 % (0.1 mg/gram) vaginal cream Place vaginally.      latanoprost 0.005 % ophthalmic solution Place 1 drop into both eyes  "every evening.      triamcinolone acetonide 0.1% (KENALOG) 0.1 % cream Apply topically 2 (two) times daily as needed.      triamterene-hydrochlorothiazide 37.5-25 mg (MAXZIDE-25) 37.5-25 mg per tablet Take 1 tablet by mouth once daily. 90 tablet 3    [DISCONTINUED] pregabalin (LYRICA) 50 MG capsule Take 1 capsule (50 mg total) by mouth 2 (two) times daily. 60 capsule 2    [DISCONTINUED] tiZANidine (ZANAFLEX) 4 MG tablet Take 1 tablet (4 mg total) by mouth 3 (three) times daily as needed (muscle spasms/pain). 50 tablet 2       Allergies  Review of patient's allergies indicates:   Allergen Reactions    Nsaids (non-steroidal anti-inflammatory drug) Swelling     Entire body, no shortness of breath no tongue swelling    Brimonidine tartrate (bulk) Other (See Comments)     Decreased blood pressure    Ivp dye [iodinated contrast media] Hives    Adhesive Other (See Comments)     "Tears my skin"    Codeine Nausea Only    Opioids - morphine analogues Nausea And Vomiting     severe       Physical Examination     Vitals:    06/26/25 0912   BP: 132/85   Pulse: 76     Spine Musculoskeletal Exam    Gait    Assistive device: cane    Inspection    Thoracolumbar    Thoracolumbar inspection is normal.    Range of Motion    Thoracolumbar        Thoracolumbar range of motion additional comments: Limited range of motion in the lumbar spine due to pain.    Strength    Thoracolumbar    Thoracolumbar motor exam is normal.       Sensory    Thoracolumbar    Thoracolumbar sensation is normal.    General      Constitutional: appears stated age, well-developed and well-nourished    Scleral icterus: no    Labored breathing: no    Psychiatric: normal mood and affect and no acute distress    Neurological: alert and oriented x3    Skin: intact       Assessment and Plan (including Health Maintenance)      Problem List  Smart Sets  Document Outside HM   :    Plan:   Degeneration of intervertebral disc of lumbar region with discogenic back pain  -     " pregabalin (LYRICA) 75 MG capsule; Take 1 capsule (75 mg total) by mouth 2 (two) times daily.  Dispense: 60 capsule; Refill: 2    Lumbar spondylosis  -     pregabalin (LYRICA) 75 MG capsule; Take 1 capsule (75 mg total) by mouth 2 (two) times daily.  Dispense: 60 capsule; Refill: 2    Chronic back pain greater than 3 months duration  -     pregabalin (LYRICA) 75 MG capsule; Take 1 capsule (75 mg total) by mouth 2 (two) times daily.  Dispense: 60 capsule; Refill: 2    Left hip pain  -     X-Ray Hip 2 or 3 views Left with Pelvis when performed; Future; Expected date: 06/26/2025    Dorsalgia, unspecified  -     pregabalin (LYRICA) 75 MG capsule; Take 1 capsule (75 mg total) by mouth 2 (two) times daily.  Dispense: 60 capsule; Refill: 2         Problem List Items Addressed This Visit          Neuro    Lumbar degenerative disc disease - Primary    Relevant Medications    pregabalin (LYRICA) 75 MG capsule    Lumbar spondylosis    Relevant Medications    pregabalin (LYRICA) 75 MG capsule       Orthopedic    Chronic back pain greater than 3 months duration    Relevant Medications    pregabalin (LYRICA) 75 MG capsule     Other Visit Diagnoses         Left hip pain        Relevant Orders    X-Ray Hip 2 or 3 views Left with Pelvis when performed      Dorsalgia, unspecified        Relevant Medications    pregabalin (LYRICA) 75 MG capsule              Future Appointments   Date Time Provider Department Center   11/21/2025  9:45 AM Karan Wyman II, MD [facility] [id number]        There are no Patient Instructions on file for this visit.  No follow-ups on file.     Signature:  Reyna Mccarthy MD      Date of encounter: 6/26/25

## 2025-06-27 ENCOUNTER — PATIENT OUTREACH (OUTPATIENT)
Facility: CLINIC | Age: 75
End: 2025-06-27
Payer: MEDICARE

## 2025-06-27 NOTE — LETTER
06/27/2025      Dear [facility] is committed to your overall health. Periodically we review the health information in your chart to make sure you are up to date on all of your recommended tests and/or procedures.       Our review of your chart shows that you may be due for            Patient is not due for any topics at this time.                    If you have had any of the above done at another facility, please let us know and we will request a copy of the report to update your [facility] record.     Gentle Reminder- Your next appointment with Dr Wyman is 11/21/2025 @ 9:45 am and it is a Wellness visit.    At your convenience I would like to speak to you to help get these items scheduled (if needed) and also see if there is anything else we can do to help you. Please send me a message via your patient portal or give me a call at [phone].  I am looking forward to speaking with you soon.     Sincerely,      Elisabeth Care Coordinator  Karan Wyman II, MD and your [facility] Team

## 2025-06-27 NOTE — PROGRESS NOTES
No call needed VB outreach done. Chart reviewed/updated.No SDOH needs No med needs. Pt now is under Pain Management care now. Pt has wellness 11/21/2025 with PCP . No HM screenings due. Portal outreach letter sent. All labs have been ordered by PCP

## 2025-07-01 ENCOUNTER — RESULTS FOLLOW-UP (OUTPATIENT)
Facility: CLINIC | Age: 75
End: 2025-07-01

## 2025-07-11 ENCOUNTER — TELEPHONE (OUTPATIENT)
Facility: CLINIC | Age: 75
End: 2025-07-11
Payer: MEDICARE

## 2025-08-13 ENCOUNTER — TELEPHONE (OUTPATIENT)
Facility: CLINIC | Age: 75
End: 2025-08-13
Payer: MEDICARE

## 2025-08-15 ENCOUNTER — OFFICE VISIT (OUTPATIENT)
Facility: CLINIC | Age: 75
End: 2025-08-15
Payer: MEDICARE

## 2025-08-15 VITALS
DIASTOLIC BLOOD PRESSURE: 81 MMHG | HEART RATE: 56 BPM | BODY MASS INDEX: 22.6 KG/M2 | HEIGHT: 67 IN | WEIGHT: 144 LBS | SYSTOLIC BLOOD PRESSURE: 139 MMHG

## 2025-08-15 DIAGNOSIS — M54.9 CHRONIC BACK PAIN GREATER THAN 3 MONTHS DURATION: ICD-10-CM

## 2025-08-15 DIAGNOSIS — M46.1 SACROILIITIS: Primary | ICD-10-CM

## 2025-08-15 DIAGNOSIS — M51.360 DEGENERATION OF INTERVERTEBRAL DISC OF LUMBAR REGION WITH DISCOGENIC BACK PAIN: ICD-10-CM

## 2025-08-15 DIAGNOSIS — M47.816 LUMBAR SPONDYLOSIS: ICD-10-CM

## 2025-08-15 DIAGNOSIS — G89.29 CHRONIC BACK PAIN GREATER THAN 3 MONTHS DURATION: ICD-10-CM

## 2025-08-15 DIAGNOSIS — M53.3 SACROILIAC JOINT PAIN: ICD-10-CM

## (undated) DEVICE — NDL QUINCKE SPINAL 25G 3.5IN

## (undated) DEVICE — DRAPE UTILITY W/ TAPE 20X30IN

## (undated) DEVICE — ADAPTER DUAL NSL LUER M-M 7FT

## (undated) DEVICE — NDL SYR 10ML 18X1.5 LL BLUNT

## (undated) DEVICE — DRAPE MEDIUM SHEET 40X70IN

## (undated) DEVICE — NDL HYPO REG 25G X 1 1/2

## (undated) DEVICE — SET SMARTSITE EXT SMALLBORE NF

## (undated) DEVICE — NDL FLTR 5MCRN BLNT TIP 18GX1

## (undated) DEVICE — Device

## (undated) DEVICE — CHLORAPREP 10.5 ML APPLICATOR

## (undated) DEVICE — SYR 3ML LL 18GA 1.5IN

## (undated) DEVICE — GLOVE SIGNATURE MICRO LTX 6.5